# Patient Record
Sex: MALE | Race: BLACK OR AFRICAN AMERICAN | NOT HISPANIC OR LATINO | Employment: UNEMPLOYED | URBAN - METROPOLITAN AREA
[De-identification: names, ages, dates, MRNs, and addresses within clinical notes are randomized per-mention and may not be internally consistent; named-entity substitution may affect disease eponyms.]

---
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
## 2022-03-29 ENCOUNTER — HOSPITAL ENCOUNTER (INPATIENT)
Facility: CLINIC | Age: 22
LOS: 2 days | Discharge: HOME | End: 2022-03-31
Attending: EMERGENCY MEDICINE | Admitting: HOSPITALIST
Payer: COMMERCIAL

## 2022-03-29 DIAGNOSIS — E10.10 DIABETIC KETOACIDOSIS WITHOUT COMA ASSOCIATED WITH TYPE 1 DIABETES MELLITUS (H): ICD-10-CM

## 2022-03-29 DIAGNOSIS — E10.65 TYPE 1 DIABETES MELLITUS WITH HYPERGLYCEMIA (H): Primary | ICD-10-CM

## 2022-03-29 LAB
ALBUMIN SERPL-MCNC: 3.5 G/DL (ref 3.4–5)
ALBUMIN UR-MCNC: 30 MG/DL
ALP SERPL-CCNC: 142 U/L (ref 65–260)
ALT SERPL W P-5'-P-CCNC: 44 U/L (ref 0–50)
ANION GAP SERPL CALCULATED.3IONS-SCNC: 13 MMOL/L (ref 3–14)
ANION GAP SERPL CALCULATED.3IONS-SCNC: 17 MMOL/L (ref 3–14)
ANION GAP SERPL CALCULATED.3IONS-SCNC: 18 MMOL/L (ref 3–14)
ANION GAP SERPL CALCULATED.3IONS-SCNC: 21 MMOL/L (ref 3–14)
ANION GAP SERPL CALCULATED.3IONS-SCNC: 26 MMOL/L (ref 3–14)
APPEARANCE UR: CLEAR
AST SERPL W P-5'-P-CCNC: 26 U/L (ref 0–35)
ATRIAL RATE - MUSE: 110 BPM
BASE EXCESS BLDV CALC-SCNC: -11.2 MMOL/L (ref -7.7–1.9)
BASE EXCESS BLDV CALC-SCNC: -18.5 MMOL/L (ref -7.7–1.9)
BASOPHILS # BLD AUTO: 0.1 10E3/UL (ref 0–0.2)
BASOPHILS NFR BLD AUTO: 1 %
BILIRUB DIRECT SERPL-MCNC: <0.1 MG/DL (ref 0–0.2)
BILIRUB SERPL-MCNC: 0.5 MG/DL (ref 0.2–1.3)
BILIRUB UR QL STRIP: NEGATIVE
BUN SERPL-MCNC: 11 MG/DL (ref 7–30)
BUN SERPL-MCNC: 12 MG/DL (ref 7–30)
BUN SERPL-MCNC: 12 MG/DL (ref 7–30)
BUN SERPL-MCNC: 15 MG/DL (ref 7–30)
BUN SERPL-MCNC: 9 MG/DL (ref 7–30)
CALCIUM SERPL-MCNC: 7.8 MG/DL (ref 8.5–10.1)
CALCIUM SERPL-MCNC: 8.2 MG/DL (ref 8.5–10.1)
CALCIUM SERPL-MCNC: 8.3 MG/DL (ref 8.5–10.1)
CALCIUM SERPL-MCNC: 8.6 MG/DL (ref 8.5–10.1)
CALCIUM SERPL-MCNC: 8.9 MG/DL (ref 8.5–10.1)
CHLORIDE BLD-SCNC: 103 MMOL/L (ref 98–110)
CHLORIDE BLD-SCNC: 104 MMOL/L (ref 98–110)
CHLORIDE BLD-SCNC: 104 MMOL/L (ref 98–110)
CHLORIDE BLD-SCNC: 105 MMOL/L (ref 98–110)
CHLORIDE BLD-SCNC: 97 MMOL/L (ref 98–110)
CO2 SERPL-SCNC: 14 MMOL/L (ref 20–32)
CO2 SERPL-SCNC: 5 MMOL/L (ref 20–32)
CO2 SERPL-SCNC: 6 MMOL/L (ref 20–32)
CO2 SERPL-SCNC: 8 MMOL/L (ref 20–32)
CO2 SERPL-SCNC: 9 MMOL/L (ref 20–32)
COLOR UR AUTO: ABNORMAL
CREAT SERPL-MCNC: 0.79 MG/DL (ref 0.5–1)
CREAT SERPL-MCNC: 0.81 MG/DL (ref 0.5–1)
CREAT SERPL-MCNC: 0.85 MG/DL (ref 0.5–1)
CREAT SERPL-MCNC: 0.88 MG/DL (ref 0.5–1)
CREAT SERPL-MCNC: 0.95 MG/DL (ref 0.5–1)
DIASTOLIC BLOOD PRESSURE - MUSE: NORMAL MMHG
EOSINOPHIL # BLD AUTO: 0 10E3/UL (ref 0–0.7)
EOSINOPHIL NFR BLD AUTO: 0 %
ERYTHROCYTE [DISTWIDTH] IN BLOOD BY AUTOMATED COUNT: 13.2 % (ref 10–15)
GFR SERPL CREATININE-BSD FRML MDRD: >90 ML/MIN/1.73M2
GLUCOSE BLD-MCNC: 140 MG/DL (ref 70–99)
GLUCOSE BLD-MCNC: 174 MG/DL (ref 70–99)
GLUCOSE BLD-MCNC: 223 MG/DL (ref 70–99)
GLUCOSE BLD-MCNC: 315 MG/DL (ref 70–99)
GLUCOSE BLD-MCNC: 488 MG/DL (ref 70–99)
GLUCOSE BLDC GLUCOMTR-MCNC: 113 MG/DL (ref 70–99)
GLUCOSE BLDC GLUCOMTR-MCNC: 127 MG/DL (ref 70–99)
GLUCOSE BLDC GLUCOMTR-MCNC: 139 MG/DL (ref 70–99)
GLUCOSE BLDC GLUCOMTR-MCNC: 139 MG/DL (ref 70–99)
GLUCOSE BLDC GLUCOMTR-MCNC: 142 MG/DL (ref 70–99)
GLUCOSE BLDC GLUCOMTR-MCNC: 143 MG/DL (ref 70–99)
GLUCOSE BLDC GLUCOMTR-MCNC: 155 MG/DL (ref 70–99)
GLUCOSE BLDC GLUCOMTR-MCNC: 161 MG/DL (ref 70–99)
GLUCOSE BLDC GLUCOMTR-MCNC: 190 MG/DL (ref 70–99)
GLUCOSE BLDC GLUCOMTR-MCNC: 222 MG/DL (ref 70–99)
GLUCOSE BLDC GLUCOMTR-MCNC: 239 MG/DL (ref 70–99)
GLUCOSE BLDC GLUCOMTR-MCNC: 240 MG/DL (ref 70–99)
GLUCOSE BLDC GLUCOMTR-MCNC: 299 MG/DL (ref 70–99)
GLUCOSE BLDC GLUCOMTR-MCNC: 338 MG/DL (ref 70–99)
GLUCOSE BLDC GLUCOMTR-MCNC: 540 MG/DL (ref 70–99)
GLUCOSE UR STRIP-MCNC: >=1000 MG/DL
HBA1C MFR BLD: 13 % (ref 0–5.6)
HCO3 BLDV-SCNC: 14 MMOL/L (ref 21–28)
HCO3 BLDV-SCNC: 4 MMOL/L (ref 21–28)
HCO3 BLDV-SCNC: 8 MMOL/L (ref 21–28)
HCT VFR BLD AUTO: 51.3 % (ref 40–53)
HGB BLD-MCNC: 16.5 G/DL (ref 13.3–17.7)
HGB UR QL STRIP: ABNORMAL
IMM GRANULOCYTES # BLD: 0.1 10E3/UL
IMM GRANULOCYTES NFR BLD: 1 %
INTERPRETATION ECG - MUSE: NORMAL
KETONES BLD-SCNC: 3 MMOL/L (ref 0–0.6)
KETONES BLD-SCNC: 4.3 MMOL/L (ref 0–0.6)
KETONES BLD-SCNC: 4.9 MMOL/L (ref 0–0.6)
KETONES UR STRIP-MCNC: >150 MG/DL
LACTATE BLD-SCNC: 1.8 MMOL/L
LEUKOCYTE ESTERASE UR QL STRIP: NEGATIVE
LYMPHOCYTES # BLD AUTO: 2.1 10E3/UL (ref 0.8–5.3)
LYMPHOCYTES NFR BLD AUTO: 19 %
MAGNESIUM SERPL-MCNC: 2.4 MG/DL (ref 1.6–2.3)
MCH RBC QN AUTO: 28 PG (ref 26.5–33)
MCHC RBC AUTO-ENTMCNC: 32.2 G/DL (ref 31.5–36.5)
MCV RBC AUTO: 87 FL (ref 78–100)
MONOCYTES # BLD AUTO: 0.4 10E3/UL (ref 0–1.3)
MONOCYTES NFR BLD AUTO: 3 %
MUCOUS THREADS #/AREA URNS LPF: PRESENT /LPF
NEUTROPHILS # BLD AUTO: 8.8 10E3/UL (ref 1.6–8.3)
NEUTROPHILS NFR BLD AUTO: 76 %
NITRATE UR QL: NEGATIVE
NRBC # BLD AUTO: 0 10E3/UL
NRBC BLD AUTO-RTO: 0 /100
O2/TOTAL GAS SETTING VFR VENT: 0 %
O2/TOTAL GAS SETTING VFR VENT: 0 %
OSMOLALITY SERPL: 319 MMOL/KG (ref 275–295)
P AXIS - MUSE: 67 DEGREES
PCO2 BLDV: 16 MM HG (ref 40–50)
PCO2 BLDV: 23 MM HG (ref 40–50)
PCO2 BLDV: 29 MM HG (ref 40–50)
PH BLDV: 7.06 [PH] (ref 7.32–7.43)
PH BLDV: 7.16 [PH] (ref 7.32–7.43)
PH BLDV: 7.29 [PH] (ref 7.32–7.43)
PH UR STRIP: 5 [PH] (ref 5–7)
PHOSPHATE SERPL-MCNC: 3.8 MG/DL (ref 2.5–4.5)
PLATELET # BLD AUTO: 352 10E3/UL (ref 150–450)
PO2 BLDV: 45 MM HG (ref 25–47)
PO2 BLDV: 64 MM HG (ref 25–47)
PO2 BLDV: 77 MM HG (ref 25–47)
POTASSIUM BLD-SCNC: 4.2 MMOL/L (ref 3.4–5.3)
POTASSIUM BLD-SCNC: 4.6 MMOL/L (ref 3.4–5.3)
POTASSIUM BLD-SCNC: 5 MMOL/L (ref 3.4–5.3)
PR INTERVAL - MUSE: 160 MS
PROT SERPL-MCNC: 8.1 G/DL (ref 6.8–8.8)
QRS DURATION - MUSE: 92 MS
QT - MUSE: 356 MS
QTC - MUSE: 481 MS
R AXIS - MUSE: 266 DEGREES
RBC # BLD AUTO: 5.89 10E6/UL (ref 4.4–5.9)
RBC URINE: 0 /HPF
SAO2 % BLDV: 82 % (ref 94–100)
SARS-COV-2 RNA RESP QL NAA+PROBE: NEGATIVE
SODIUM SERPL-SCNC: 128 MMOL/L (ref 133–144)
SODIUM SERPL-SCNC: 130 MMOL/L (ref 133–144)
SODIUM SERPL-SCNC: 130 MMOL/L (ref 133–144)
SODIUM SERPL-SCNC: 131 MMOL/L (ref 133–144)
SODIUM SERPL-SCNC: 131 MMOL/L (ref 133–144)
SP GR UR STRIP: 1.03 (ref 1–1.03)
SYSTOLIC BLOOD PRESSURE - MUSE: NORMAL MMHG
T AXIS - MUSE: 75 DEGREES
TROPONIN I SERPL HS-MCNC: <3 NG/L
UROBILINOGEN UR STRIP-MCNC: NORMAL MG/DL
VENTRICULAR RATE- MUSE: 110 BPM
WBC # BLD AUTO: 11.5 10E3/UL (ref 4–11)
WBC URINE: 0 /HPF

## 2022-03-29 PROCEDURE — 99223 1ST HOSP IP/OBS HIGH 75: CPT | Performed by: HOSPITALIST

## 2022-03-29 PROCEDURE — 93010 ELECTROCARDIOGRAM REPORT: CPT | Performed by: INTERNAL MEDICINE

## 2022-03-29 PROCEDURE — 82310 ASSAY OF CALCIUM: CPT | Performed by: NURSE PRACTITIONER

## 2022-03-29 PROCEDURE — 96361 HYDRATE IV INFUSION ADD-ON: CPT

## 2022-03-29 PROCEDURE — 36415 COLL VENOUS BLD VENIPUNCTURE: CPT | Performed by: HOSPITALIST

## 2022-03-29 PROCEDURE — 82248 BILIRUBIN DIRECT: CPT | Performed by: HOSPITALIST

## 2022-03-29 PROCEDURE — 82310 ASSAY OF CALCIUM: CPT | Performed by: HOSPITALIST

## 2022-03-29 PROCEDURE — 83735 ASSAY OF MAGNESIUM: CPT | Performed by: EMERGENCY MEDICINE

## 2022-03-29 PROCEDURE — 96365 THER/PROPH/DIAG IV INF INIT: CPT

## 2022-03-29 PROCEDURE — 82803 BLOOD GASES ANY COMBINATION: CPT | Performed by: HOSPITALIST

## 2022-03-29 PROCEDURE — 36415 COLL VENOUS BLD VENIPUNCTURE: CPT | Performed by: EMERGENCY MEDICINE

## 2022-03-29 PROCEDURE — 99207 PR NO CHARGE LOS: CPT | Performed by: NURSE PRACTITIONER

## 2022-03-29 PROCEDURE — 83036 HEMOGLOBIN GLYCOSYLATED A1C: CPT | Performed by: EMERGENCY MEDICINE

## 2022-03-29 PROCEDURE — 250N000012 HC RX MED GY IP 250 OP 636 PS 637: Performed by: NURSE PRACTITIONER

## 2022-03-29 PROCEDURE — 258N000003 HC RX IP 258 OP 636: Performed by: NURSE PRACTITIONER

## 2022-03-29 PROCEDURE — C9803 HOPD COVID-19 SPEC COLLECT: HCPCS

## 2022-03-29 PROCEDURE — 96366 THER/PROPH/DIAG IV INF ADDON: CPT

## 2022-03-29 PROCEDURE — U0005 INFEC AGEN DETEC AMPLI PROBE: HCPCS | Performed by: EMERGENCY MEDICINE

## 2022-03-29 PROCEDURE — 250N000011 HC RX IP 250 OP 636: Performed by: NURSE PRACTITIONER

## 2022-03-29 PROCEDURE — 36415 COLL VENOUS BLD VENIPUNCTURE: CPT | Performed by: NURSE PRACTITIONER

## 2022-03-29 PROCEDURE — 93005 ELECTROCARDIOGRAM TRACING: CPT

## 2022-03-29 PROCEDURE — 250N000011 HC RX IP 250 OP 636: Performed by: EMERGENCY MEDICINE

## 2022-03-29 PROCEDURE — 87040 BLOOD CULTURE FOR BACTERIA: CPT | Performed by: EMERGENCY MEDICINE

## 2022-03-29 PROCEDURE — 83605 ASSAY OF LACTIC ACID: CPT

## 2022-03-29 PROCEDURE — 99285 EMERGENCY DEPT VISIT HI MDM: CPT | Mod: 25

## 2022-03-29 PROCEDURE — 82803 BLOOD GASES ANY COMBINATION: CPT

## 2022-03-29 PROCEDURE — 84484 ASSAY OF TROPONIN QUANT: CPT | Performed by: EMERGENCY MEDICINE

## 2022-03-29 PROCEDURE — 84100 ASSAY OF PHOSPHORUS: CPT | Performed by: EMERGENCY MEDICINE

## 2022-03-29 PROCEDURE — 83930 ASSAY OF BLOOD OSMOLALITY: CPT | Performed by: EMERGENCY MEDICINE

## 2022-03-29 PROCEDURE — 99223 1ST HOSP IP/OBS HIGH 75: CPT | Mod: AI | Performed by: NURSE PRACTITIONER

## 2022-03-29 PROCEDURE — 258N000003 HC RX IP 258 OP 636: Performed by: EMERGENCY MEDICINE

## 2022-03-29 PROCEDURE — 250N000012 HC RX MED GY IP 250 OP 636 PS 637: Performed by: EMERGENCY MEDICINE

## 2022-03-29 PROCEDURE — 82010 KETONE BODYS QUAN: CPT | Performed by: HOSPITALIST

## 2022-03-29 PROCEDURE — 82010 KETONE BODYS QUAN: CPT | Performed by: EMERGENCY MEDICINE

## 2022-03-29 PROCEDURE — 80053 COMPREHEN METABOLIC PANEL: CPT | Performed by: EMERGENCY MEDICINE

## 2022-03-29 PROCEDURE — 81001 URINALYSIS AUTO W/SCOPE: CPT | Performed by: EMERGENCY MEDICINE

## 2022-03-29 PROCEDURE — 120N000001 HC R&B MED SURG/OB

## 2022-03-29 PROCEDURE — 85014 HEMATOCRIT: CPT | Performed by: EMERGENCY MEDICINE

## 2022-03-29 RX ORDER — SODIUM CHLORIDE 9 MG/ML
1000 INJECTION, SOLUTION INTRAVENOUS CONTINUOUS
Status: DISCONTINUED | OUTPATIENT
Start: 2022-03-29 | End: 2022-03-30

## 2022-03-29 RX ORDER — DEXTROSE MONOHYDRATE 25 G/50ML
25-50 INJECTION, SOLUTION INTRAVENOUS
Status: DISCONTINUED | OUTPATIENT
Start: 2022-03-29 | End: 2022-03-30

## 2022-03-29 RX ORDER — NICOTINE POLACRILEX 4 MG
15-30 LOZENGE BUCCAL
Status: DISCONTINUED | OUTPATIENT
Start: 2022-03-29 | End: 2022-03-30

## 2022-03-29 RX ORDER — SODIUM CHLORIDE AND POTASSIUM CHLORIDE 150; 450 MG/100ML; MG/100ML
INJECTION, SOLUTION INTRAVENOUS CONTINUOUS
Status: DISCONTINUED | OUTPATIENT
Start: 2022-03-29 | End: 2022-03-30

## 2022-03-29 RX ORDER — DEXTROSE MONOHYDRATE 25 G/50ML
25-50 INJECTION, SOLUTION INTRAVENOUS
Status: DISCONTINUED | OUTPATIENT
Start: 2022-03-29 | End: 2022-03-29

## 2022-03-29 RX ORDER — POTASSIUM CHLORIDE 1.5 G/1.58G
40 POWDER, FOR SOLUTION ORAL ONCE
Status: DISCONTINUED | OUTPATIENT
Start: 2022-03-29 | End: 2022-03-29

## 2022-03-29 RX ORDER — DEXTROSE MONOHYDRATE, SODIUM CHLORIDE, AND POTASSIUM CHLORIDE 50; 1.49; 4.5 G/1000ML; G/1000ML; G/1000ML
INJECTION, SOLUTION INTRAVENOUS CONTINUOUS
Status: DISCONTINUED | OUTPATIENT
Start: 2022-03-29 | End: 2022-03-30

## 2022-03-29 RX ORDER — POTASSIUM CHLORIDE 7.45 MG/ML
10 INJECTION INTRAVENOUS ONCE
Status: COMPLETED | OUTPATIENT
Start: 2022-03-29 | End: 2022-03-29

## 2022-03-29 RX ADMIN — SODIUM CHLORIDE 2 UNITS/HR: 9 INJECTION, SOLUTION INTRAVENOUS at 14:34

## 2022-03-29 RX ADMIN — SODIUM CHLORIDE 4.5 UNITS/HR: 9 INJECTION, SOLUTION INTRAVENOUS at 09:13

## 2022-03-29 RX ADMIN — POTASSIUM CHLORIDE, DEXTROSE MONOHYDRATE AND SODIUM CHLORIDE: 150; 5; 450 INJECTION, SOLUTION INTRAVENOUS at 12:41

## 2022-03-29 RX ADMIN — POTASSIUM CHLORIDE 10 MEQ: 7.46 INJECTION, SOLUTION INTRAVENOUS at 09:25

## 2022-03-29 RX ADMIN — SODIUM CHLORIDE 1000 ML: 9 INJECTION, SOLUTION INTRAVENOUS at 08:01

## 2022-03-29 RX ADMIN — SODIUM CHLORIDE 6 UNITS/HR: 9 INJECTION, SOLUTION INTRAVENOUS at 11:38

## 2022-03-29 RX ADMIN — SODIUM CHLORIDE 1000 ML: 9 INJECTION, SOLUTION INTRAVENOUS at 09:07

## 2022-03-29 RX ADMIN — SODIUM CHLORIDE 1000 ML: 9 INJECTION, SOLUTION INTRAVENOUS at 09:13

## 2022-03-29 RX ADMIN — POTASSIUM CHLORIDE AND SODIUM CHLORIDE: 450; 150 INJECTION, SOLUTION INTRAVENOUS at 12:07

## 2022-03-29 ASSESSMENT — ENCOUNTER SYMPTOMS
VOMITING: 1
NAUSEA: 1
FEVER: 1

## 2022-03-29 ASSESSMENT — ACTIVITIES OF DAILY LIVING (ADL)
ADLS_ACUITY_SCORE: 5
ADLS_ACUITY_SCORE: 6
ADLS_ACUITY_SCORE: 5
ADLS_ACUITY_SCORE: 5
ADLS_ACUITY_SCORE: 6
ADLS_ACUITY_SCORE: 5
ADLS_ACUITY_SCORE: 5
ADLS_ACUITY_SCORE: 6

## 2022-03-29 NOTE — PROVIDER NOTIFICATION
"   03/29/22 1319   Critical Test Results/Notification   Critical Lab Result (Lab Name and Value) Venous Blood Gas pH 7.16 and bicarb 8   What Time Did The Lab Notify You? 1320   What Provider Did You Notify? Dr. Owen   Was the Provider Notified Within 30 Min?  Yes     Lab also called about critical Ketones Qualitative Level of 4.3. Text page to Dr. Owen.     Page back from Dr. Owen to \"monitor in 2 hours.\"  "

## 2022-03-29 NOTE — ED NOTES
DATE:  3/29/2022   TIME OF RECEIPT FROM LAB:  0833  LAB TEST:  Ketone 4.9  LAB VALUE:  See above  RESULTS GIVEN WITH READ-BACK TO (PROVIDER):  Ravi Mcdonald,*  TIME LAB VALUE REPORTED TO PROVIDER:   0833

## 2022-03-29 NOTE — PROVIDER NOTIFICATION
Paged Dr. Owen    DATE:  3/29/2022   TIME OF RECEIPT FROM LAB: 3/29/22  LAB TEST:Carbon Dioxide  LAB VALUE:  8  Text Paged Dr. Owen. Provider stated to continue to monitor, and will repeat in 2 hours.

## 2022-03-29 NOTE — PROGRESS NOTES
Critical lab value, Co2 of 9. Nurse notified.    Provider paged and no new orders obtained - will continue to monitor

## 2022-03-29 NOTE — ED PROVIDER NOTES
History     Chief Complaint:  Hyperglycemia     The history is provided by the patient and the EMS personnel.      Lauro Maddox is a 21 year old male with a history of insulin-dependent type I diabetes mellitus, DKA who presents with hyperglycemia for the past few hours. He reports that he has had associated nausea, vomiting, and subjective fevers. Per EMS, his blood glucose was 579 en route to the ED. He is not sure what may have triggered the hyperglycemia. Here in the ED, Lauro reports that he is prescribed Novolog and Lantus for his insulin but has only been using these intermittently. He states that he was most recently in DKA one year ago. He denies regular alcohol use but reports that he does smoke tobacco and use marijuana.    Review of Systems   Constitutional: Positive for fever (subjective).   Gastrointestinal: Positive for nausea and vomiting.   All other systems reviewed and are negative.    Allergies:  The patient has no known allergies.     Medications:  Vitamin D  Glucagon  Insulin    Past Medical History:     Type I diabetes mellitus, insulin dependent   DKA  Vitamin D deficiency   STEPAN  Hyponatremia  Hoarding behavior    Past Surgical History:    ORIF thumb, right     Family History:    Brother: congenital heart disease     Social History:  The patient presents to the ED alone.  The patient presents to the ED via EMS.  The patient is a smoker.  The patient uses marijuana.  The patient does not drink alcohol.    Physical Exam     Patient Vitals for the past 24 hrs:   BP Temp Temp src Pulse Resp SpO2 Height Weight   03/29/22 1053 -- -- -- 112 30 100 % -- --   03/29/22 1031 -- -- -- 105 20 100 % -- --   03/29/22 1030 133/87 -- -- 106 21 100 % -- --   03/29/22 1000 (!) 134/91 -- -- 110 21 100 % -- --   03/29/22 0930 (!) 141/79 -- -- 110 23 100 % -- --   03/29/22 0914 -- -- -- 111 23 100 % -- --   03/29/22 0900 (!) 146/90 -- -- 111 22 100 % -- --   03/29/22 0826 -- -- -- 110 24 100 % -- --   03/29/22  "0820 -- -- -- 111 27 100 % -- --   03/29/22 0819 (!) 142/93 -- -- 110 -- -- -- --   03/29/22 0754 -- 97.6  F (36.4  C) Oral -- -- 100 % -- --   03/29/22 0751 -- -- -- 112 22 -- -- --   03/29/22 0743 138/87 -- -- -- -- -- 1.88 m (6' 2\") 74.8 kg (165 lb)     Physical Exam  General: Alert, interactive in mild distress  Head:  Scalp is atraumatic  Eyes:  The pupils are equal, round, and reactive to light    EOM's intact    No scleral icterus  ENT:      Nose:  The external nose is normal  Ears:  External ears are normal  Mouth/Throat: The oropharynx is normal    Mucus membranes are dry      Neck:  Normal range of motion.      There is no rigidity.    Trachea is in the midline         CV:  Tachycardic rate with regular rhythm    No murmur   Resp:  Breath sounds are clear bilaterally    Non-labored, no retractions or accessory muscle use      GI:  Abdomen is soft, no distension, no tenderness.       MS:  Normal strength in all 4 extremities  Skin:  Warm and dry, No rash or lesions noted.  Neuro: Strength 5/5 x4.  Sensation intact  In all 4 extremities.      GCS: 15  Psych:  Awake. Alert.  Normal affect.      Appropriate interactions.    Emergency Department Course     ECG:  ECG taken at 0757, ECG read at 0811  Sinus tachycardia  Possible left atrial enlargement  Right superior axis deviation  Incomplete right bundle branch block   Nonspecific ST abnormality  Abnormal ECG  No significant change as compared to prior, dated 7/22/20.  Rate 110 bpm. WA interval 160 ms. QRS duration 92 ms. QT/QTc 356/481 ms. P-R-T axes 67 266 75.     Laboratory:  Labs Ordered and Resulted from Time of ED Arrival to Time of ED Departure   HEMOGLOBIN A1C - Abnormal       Result Value    Hemoglobin A1C 13.0 (*)    BASIC METABOLIC PANEL - Abnormal    Sodium 128 (*)     Potassium 5.0      Chloride 97 (*)     Carbon Dioxide (CO2) 5 (*)     Anion Gap 26 (*)     Urea Nitrogen 15      Creatinine 0.95      Calcium 8.9      Glucose 488 (*)     GFR Estimate " >90     MAGNESIUM - Abnormal    Magnesium 2.4 (*)    OSMOLALITY - Abnormal    Osmolality Blood 319 (*)    KETONE BETA-HYDROXYBUTYRATE QUANTITATIVE, RAPID - Abnormal    Ketone (Beta-Hydroxybutyrate) Quantitative 4.9 (*)    ROUTINE UA WITH MICROSCOPIC REFLEX TO CULTURE - Abnormal    Color Urine Straw      Appearance Urine Clear      Glucose Urine >=1000 (*)     Bilirubin Urine Negative      Ketones Urine >150 (*)     Specific Gravity Urine 1.029      Blood Urine Trace (*)     pH Urine 5.0      Protein Albumin Urine 30  (*)     Urobilinogen Urine Normal      Nitrite Urine Negative      Leukocyte Esterase Urine Negative      Mucus Urine Present (*)     RBC Urine 0      WBC Urine 0     GLUCOSE BY METER - Abnormal    GLUCOSE BY METER POCT 540 (*)    CBC WITH PLATELETS AND DIFFERENTIAL - Abnormal    WBC Count 11.5 (*)     RBC Count 5.89      Hemoglobin 16.5      Hematocrit 51.3      MCV 87      MCH 28.0      MCHC 32.2      RDW 13.2      Platelet Count 352      % Neutrophils 76      % Lymphocytes 19      % Monocytes 3      % Eosinophils 0      % Basophils 1      % Immature Granulocytes 1      NRBCs per 100 WBC 0      Absolute Neutrophils 8.8 (*)     Absolute Lymphocytes 2.1      Absolute Monocytes 0.4      Absolute Eosinophils 0.0      Absolute Basophils 0.1      Absolute Immature Granulocytes 0.1      Absolute NRBCs 0.0     ISTAT GASES LACTATE VENOUS POCT - Abnormal    Lactic Acid POCT 1.8      Bicarbonate Venous POCT 4 (*)     O2 Sat, Venous POCT 82 (*)     pCO2V Venous POCT 16 (*)     pH Venous POCT 7.06 (*)     pO2 Venous POCT 64 (*)    GLUCOSE BY METER - Abnormal    GLUCOSE BY METER POCT 338 (*)    GLUCOSE BY METER - Abnormal    GLUCOSE BY METER POCT 299 (*)    BASIC METABOLIC PANEL - Abnormal    Sodium 130 (*)     Potassium 4.6      Chloride 103      Carbon Dioxide (CO2) 6 (*)     Anion Gap 21 (*)     Urea Nitrogen 12      Creatinine 0.88      Calcium 7.8 (*)     Glucose 315 (*)     GFR Estimate >90     GLUCOSE BY  METER - Abnormal    GLUCOSE BY METER POCT 222 (*)    PHOSPHORUS - Normal    Phosphorus 3.8     TROPONIN I - Normal    Troponin I High Sensitivity <3     COVID-19 VIRUS (CORONAVIRUS) BY PCR - Normal    SARS CoV2 PCR Negative     GLUCOSE MONITOR NURSING POCT   BLOOD CULTURE   BLOOD CULTURE      Emergency Department Course:       Reviewed:  I reviewed nursing notes, vitals, past medical history, Care Everywhere    Assessments:  0751 I obtained history and examined the patient as noted above.   0910 I rechecked the patient and explained findings.     Consults:  0918 I spoke with Dr. Owen from the hospitalist service regarding the patient's presentation, findings here in the ED, and plan of care.     Interventions:  0801 NS 1 L IV  0907 NS 1 L IV  0913 Insulin drip 4.5 units/hr IV  0913 NS 1 L IV  0925 Potassium chloride 10 mEq IV  1015 Insulin drip dose change 8 units/hr IV    Disposition:  The patient was admitted to the hospital under the care of Dr. Owen.     Impression & Plan     Medical Decision Making:  Lauro Maddox is a 21 year old male who presents to the emergency department for evaluation of hyperglycemia.  Findings are consistent with diabetic ketoacidosis, there is no signs of an acute infectious etiology, no signs of acute coronary syndrome.  After IV fluid resuscitation and initiation of the insulin drip his blood sugar trended down nicely.  He was quite acidotic at arrival consistent with DKA.  I discussed case with the hospitalist service who is in agreement with this plan of action.  Patient was subsequently admitted for further evaluation and treatment.    Critical Care Time: was 30 minutes for this patient excluding procedures, during this time there was management of the insulin drip, fluid resuscitation, physician consultation, and chart review.    Diagnosis:    ICD-10-CM    1. Diabetic ketoacidosis without coma associated with type 1 diabetes mellitus (H)  E10.10      Scribe  Disclosure:  I, Radha Dennis, am serving as a scribe at 7:45 AM on 3/29/2022 to document services personally performed by Ravi Mcdonald MD based on my observations and the provider's statements to me.        Ravi Mcdonald MD  03/29/22 5937

## 2022-03-29 NOTE — ED NOTES
Bed: ED21  Expected date:   Expected time:   Means of arrival:   Comments:  449  21 M glucose [phone]

## 2022-03-29 NOTE — ED TRIAGE NOTES
Patient presents to ED via EMS from home. Patient woke up at 3:30am this morning not feeling well, weakness with n/v. Patient type 1 diabetic, does not have insulin pump. States his sugar this morning was 579. Patient states he took insulin last night, but has not been taking insulin daily. He takes Lantus and Novolog. He also reports pain to his entire back. Denies any recent illnesses.

## 2022-03-29 NOTE — H&P
[facility]    History and Physical - Hospitalist Service       Date of Admission:  3/29/2022    Assessment & Plan      Lauro Maddox is a 21 year old male admitted on 3/29/2022. He has a PMHx includes uncontrolled T1DM who presents to the ED with dizziness/lightheadedness, nausea/vomiting and glucose >500.    DKA 2/ medication noncompliance  Type I Diabetes Mellitus, uncontrolled  AG Metabolic Acidosis 2/2 above   Hx of DKA hospitalizations   Reports to ED he takes insulin intermittently, endorses nausea/vomiting x several hours PTA. PTA meds lantus and novolog. Glucose 540 on admission. VBG 7.06/16/64/4. Fluid resuscitated in ED 0.9% NS, insulin infusion initiated. Corrected Na 134. HgbA1c 13% - follows w/ Endocrinology in outpt setting, but was no-show for last appointment.   -- start 0.45% w/ kcl infusion on admission  -- BMP every 2 hours   -- continuous insulin infusion   -- IMC monitoring     Leukocytosis  WBC 11.5 on admission, suspect stress response. No compelling evidence for acute infection, Blood cultures pending.  --monitor fever curve       Diet:  NPO  DVT Prophylaxis: Pneumatic Compression Devices  Wilson Catheter: Not present  Central Lines: None  Cardiac Monitoring: None  Code Status:  Full Code     Clinically Significant Risk Factors Present on Admission         # Hyponatremia: Na = 128 mmol/L (Ref range: 133 - 144 mmol/L) on admission, will monitor as appropriate    # Anion Gap Metabolic Acidosis: AG = 26 mmol/L (Ref range: 3 - 14 mmol/L) on admission, will monitor and treat as appropriate           Disposition Plan   Expected Discharge:  2-3 days    Anticipated discharge location:  Awaiting care coordination huddle  Delays:   NA        The patient's care was discussed with the Attending Physician, Dr. Owen and Patient.    DESIREE [facility]  Hospitalist Service  [facility]  Securely message with the Vocera Web Console (learn more  here)  Text page via [facility] Paging/Directory         ______________________________________________________________________    Chief Complaint   Dizziness, lightheadedness     History is obtained from the patient and [facility]     History of Present Illness   Lauro Maddox is a 21 year old male who has a PMHx includes uncontrolled T1DM who presents to the ED with dizziness/lightheadedness, nausea/vomiting and glucose >500.    Patient is minimally cooperative with interview, much of the history is obtained through epic documentation and ED physician.  Patient notes he intermittently takes his insulin to the ED provider, however tells me he takes it every day.  He notes subjective nausea/vomiting starting several hours prior to admission, and endorses dizziness/lightheadedness and general feeling of unwell.  He has had multiple hospitalizations with DKA, and suspected he was again experiencing DKA so presented to the ED via EMS.     ED work-up is significant for Na 128, serum bicarb 5, normal creat, AG 26, Mag 2.4, ketones 4.9, osmolality 319, lactic 1.8, trop <3. VBG 7.06/16/64/4. WBC 11.5. UA w/ >1000 glucose, postiive ketones, proteinuria. Glucose trending down with insulin infusion 588-->577-->338. 2L NS administered in ED.     On my exam, the patient is asleep on my arrival, wakes to voice.  Again, he has minimally cooperative with the interview, but denies any current pain, nausea/vomiting, dizziness, fever, shortness of breath, sick contacts. States he has been eating/drinking and urinating normally.     Review of Systems    The 10 point Review of Systems is negative other than noted in the HPI or here.     Past Medical History    I have reviewed this patient's medical history and updated it with pertinent information if needed.   Past Medical History:   Diagnosis Date     Diabetes (H)        Past Surgical History   I have reviewed this patient's surgical history and updated it with pertinent information if  needed.  Past Surgical History:   Procedure Laterality Date     NO HISTORY OF SURGERY         Social History   I have reviewed this patient's social history and updated it with pertinent information if needed.  Social History     Tobacco Use     Smoking status: Current Every Day Smoker     Types: Cigarettes     Smokeless tobacco: Never Used   Substance Use Topics     Alcohol use: None     Drug use: Yes     Types: Marijuana       Family History   I have reviewed this patient's family history and updated it with pertinent information if needed.  Family History   Problem Relation Age of Onset     Diabetes Paternal Grandfather         Onset in childhood per Lauro     Hypertension No family hx of      Cancer No family hx of      Endocrine Disease No family hx of      Thyroid Disease No family hx of      Glaucoma No family hx of      Macular Degeneration No family hx of      Cerebrovascular Disease No family hx of        Prior to Admission Medications   Prior to Admission Medications   Prescriptions Last Dose Informant Patient Reported? Taking?   acetone, Urine, test STRP   No No   Sig: Test ketones when sick or when have 2 blood sugars in a row >300   blood glucose (ACCU-CHEK FASTCLIX) lancing device   No No   Sig: Device to be used with lancets.   blood glucose monitoring (ACCU-CHEK JULIO C PLUS) meter device kit   No No   Sig: Use to test blood sugars 6-8 times daily or as directed.   blood glucose monitoring (ACCU-CHEK JULIO C PLUS) test strip   No No   Sig: Use to test blood sugar 10 times daily or as directed. PA approved 17-20   blood glucose monitoring (ACCU-CHEK FASTCLIX) lancets   No No   Si each daily   cholecalciferol (VITAMIN D) 1000 UNIT tablet  Self No No   Sig: Take 1 tablet (1,000 Units) by mouth daily   cloNIDine (CATAPRES) 0.1 MG tablet  Self No No   Si tablet at bedtime, 1/2 tablet in the morning and 1/2 in the afternoon   Patient not taking: Reported on 10/27/2017   glucagon (GLUCAGON  EMERGENCY) 1 MG kit   No No   Simg injection for severe hypoglycemica involving loss of consciousness or seizure.   insulin aspart (NOVOLOG FLEXPEN) 100 UNIT/ML injection   No No   Sig: Up to 75 units daily   insulin degludec (TRESIBA) 100 UNIT/ML pen   No No   Si units once daily at the same time each day   insulin pen needle (BD ALIN U/F) 32G X 4 MM   No No   Sig: Use 8 daily or as directed.      Facility-Administered Medications: None     Allergies   No Known Allergies    Physical Exam   Vital Signs: Temp: 97.6  F (36.4  C) Temp src: Oral BP: (!) 141/79 Pulse: 110   Resp: 23 SpO2: 100 % O2 Device: None (Room air)    Weight: 165 lbs 0 oz    Physical Exam  Vitals and nursing note reviewed.   Constitutional:       General: He is not in acute distress.     Appearance: He is ill-appearing. He is not toxic-appearing.   HENT:      Mouth/Throat:      Mouth: Mucous membranes are moist.   Eyes:      Pupils: Pupils are equal, round, and reactive to light.   Cardiovascular:      Rate and Rhythm: Regular rhythm. Tachycardia present.      Pulses: Normal pulses.      Heart sounds: Normal heart sounds.   Pulmonary:      Effort: Pulmonary effort is normal. No respiratory distress.      Breath sounds: Normal breath sounds. No wheezing, rhonchi or rales.   Abdominal:      General: Abdomen is flat. There is no distension.      Palpations: Abdomen is soft.   Musculoskeletal:         General: Normal range of motion.   Skin:     General: Skin is warm and dry.   Neurological:      General: No focal deficit present.   Psychiatric:         Mood and Affect: Affect is flat.       Data   Data reviewed today: I reviewed all medications, new labs and imaging results over the last 24 hours. I personally reviewed no images or EKG's today.    Recent Labs   Lab 22  1009 22  0910 22  0813   WBC  --   --  11.5*   HGB  --   --  16.5   MCV  --   --  87   PLT  --   --  352   NA  --   --  128*   POTASSIUM  --   --  5.0    CHLORIDE  --   --  97*   CO2  --   --  5*   BUN  --   --  15   CR  --   --  0.95   ANIONGAP  --   --  26*   URBAN  --   --  8.9   * 338* 488*     No results found for this or any previous visit (from the past 24 hour(s)).

## 2022-03-29 NOTE — PHARMACY-ADMISSION MEDICATION HISTORY
Pharmacy Medication History  Admission medication history interview status for the 3/29/2022  admission is complete. See EPIC admission navigator for prior to admission medications     Location of Interview: Patient room  Medication history sources: Patient, Surescripts and Care Everywhere -- last fill per Surescripts was 7/7/21    In the past week, patient estimated taking medication this percent of the time: less than 50% due to other    Medication reconciliation completed by provider prior to medication history? No    Time spent in this activity: 10 min    Prior to Admission medications    Medication Sig Last Dose Taking? Auth Provider   acetone, Urine, test STRP Test ketones when sick or when have 2 blood sugars in a row >300   Sahrlene Maciel APRN CNP   blood glucose (ACCU-CHEK FASTCLIX) lancing device Device to be used with lancets.   Sharlene Maciel APRN CNP   blood glucose monitoring (ACCU-CHEK JULIO C PLUS) meter device kit Use to test blood sugars 6-8 times daily or as directed.   Sharlene Maciel APRN CNP   blood glucose monitoring (ACCU-CHEK JULIO C PLUS) test strip Use to test blood sugar 10 times daily or as directed. PA approved 8/7/17-9/7/20   Sharlene Maciel APRN CNP   blood glucose monitoring (ACCU-CHEK FASTCLIX) lancets 6 each daily   Sharlene Maciel APRN CNP   glucagon (GLUCAGON EMERGENCY) 1 MG kit 1mg injection for severe hypoglycemica involving loss of consciousness or seizure.   Sharlene Maciel APRN CNP   insulin aspart (NOVOLOG FLEXPEN) 100 UNIT/ML injection Up to 75 units daily   Sharlene Maciel APRN CNP   insulin glargine (LANTUS PEN) 100 UNIT/ML pen Inject 28 Units Subcutaneous At Bedtime   Unknown, Entered By History   insulin pen needle (BD ALIN U/F) 32G X 4 MM Use 8 daily or as directed.   Sharlene Maceil APRN CNP       The information provided in this note is only as accurate as the sources  available at the time of update(s)

## 2022-03-30 LAB
ALBUMIN SERPL-MCNC: 3.1 G/DL (ref 3.4–5)
ALP SERPL-CCNC: 112 U/L (ref 65–260)
ALT SERPL W P-5'-P-CCNC: 36 U/L (ref 0–50)
ANION GAP SERPL CALCULATED.3IONS-SCNC: 8 MMOL/L (ref 3–14)
AST SERPL W P-5'-P-CCNC: 24 U/L (ref 0–35)
BASOPHILS # BLD AUTO: 0.1 10E3/UL (ref 0–0.2)
BASOPHILS NFR BLD AUTO: 1 %
BILIRUB SERPL-MCNC: 0.3 MG/DL (ref 0.2–1.3)
BUN SERPL-MCNC: 10 MG/DL (ref 7–30)
CALCIUM SERPL-MCNC: 8.8 MG/DL (ref 8.5–10.1)
CHLORIDE BLD-SCNC: 110 MMOL/L (ref 98–110)
CO2 SERPL-SCNC: 18 MMOL/L (ref 20–32)
CREAT SERPL-MCNC: 0.76 MG/DL (ref 0.5–1)
EOSINOPHIL # BLD AUTO: 0.1 10E3/UL (ref 0–0.7)
EOSINOPHIL NFR BLD AUTO: 2 %
ERYTHROCYTE [DISTWIDTH] IN BLOOD BY AUTOMATED COUNT: 13.2 % (ref 10–15)
GFR SERPL CREATININE-BSD FRML MDRD: >90 ML/MIN/1.73M2
GLUCOSE BLD-MCNC: 121 MG/DL (ref 70–99)
GLUCOSE BLDC GLUCOMTR-MCNC: 101 MG/DL (ref 70–99)
GLUCOSE BLDC GLUCOMTR-MCNC: 116 MG/DL (ref 70–99)
GLUCOSE BLDC GLUCOMTR-MCNC: 131 MG/DL (ref 70–99)
GLUCOSE BLDC GLUCOMTR-MCNC: 140 MG/DL (ref 70–99)
GLUCOSE BLDC GLUCOMTR-MCNC: 143 MG/DL (ref 70–99)
GLUCOSE BLDC GLUCOMTR-MCNC: 172 MG/DL (ref 70–99)
GLUCOSE BLDC GLUCOMTR-MCNC: 192 MG/DL (ref 70–99)
GLUCOSE BLDC GLUCOMTR-MCNC: 223 MG/DL (ref 70–99)
GLUCOSE BLDC GLUCOMTR-MCNC: 241 MG/DL (ref 70–99)
GLUCOSE BLDC GLUCOMTR-MCNC: 257 MG/DL (ref 70–99)
GLUCOSE BLDC GLUCOMTR-MCNC: 400 MG/DL (ref 70–99)
GLUCOSE BLDC GLUCOMTR-MCNC: 433 MG/DL (ref 70–99)
GLUCOSE BLDC GLUCOMTR-MCNC: 441 MG/DL (ref 70–99)
GLUCOSE BLDC GLUCOMTR-MCNC: 452 MG/DL (ref 70–99)
GLUCOSE BLDC GLUCOMTR-MCNC: 90 MG/DL (ref 70–99)
HCT VFR BLD AUTO: 41.1 % (ref 40–53)
HGB BLD-MCNC: 14.1 G/DL (ref 13.3–17.7)
IMM GRANULOCYTES # BLD: 0 10E3/UL
IMM GRANULOCYTES NFR BLD: 0 %
KETONES BLD-SCNC: 0.4 MMOL/L (ref 0–0.6)
LYMPHOCYTES # BLD AUTO: 2.5 10E3/UL (ref 0.8–5.3)
LYMPHOCYTES NFR BLD AUTO: 43 %
MAGNESIUM SERPL-MCNC: 2.3 MG/DL (ref 1.6–2.3)
MCH RBC QN AUTO: 27.7 PG (ref 26.5–33)
MCHC RBC AUTO-ENTMCNC: 34.3 G/DL (ref 31.5–36.5)
MCV RBC AUTO: 81 FL (ref 78–100)
MONOCYTES # BLD AUTO: 0.7 10E3/UL (ref 0–1.3)
MONOCYTES NFR BLD AUTO: 12 %
NEUTROPHILS # BLD AUTO: 2.5 10E3/UL (ref 1.6–8.3)
NEUTROPHILS NFR BLD AUTO: 42 %
NRBC # BLD AUTO: 0 10E3/UL
NRBC BLD AUTO-RTO: 0 /100
PLAT MORPH BLD: ABNORMAL
PLATELET # BLD AUTO: 305 10E3/UL (ref 150–450)
POTASSIUM BLD-SCNC: 2.9 MMOL/L (ref 3.4–5.3)
POTASSIUM BLD-SCNC: 4.3 MMOL/L (ref 3.4–5.3)
PROT SERPL-MCNC: 6.9 G/DL (ref 6.8–8.8)
RBC # BLD AUTO: 5.09 10E6/UL (ref 4.4–5.9)
RBC MORPH BLD: ABNORMAL
SMUDGE CELLS BLD QL SMEAR: PRESENT
SODIUM SERPL-SCNC: 136 MMOL/L (ref 133–144)
TSH SERPL DL<=0.005 MIU/L-ACNC: 0.97 MU/L (ref 0.4–4)
VARIANT LYMPHS BLD QL SMEAR: PRESENT
WBC # BLD AUTO: 6 10E3/UL (ref 4–11)

## 2022-03-30 PROCEDURE — 82010 KETONE BODYS QUAN: CPT | Performed by: HOSPITALIST

## 2022-03-30 PROCEDURE — 84443 ASSAY THYROID STIM HORMONE: CPT | Performed by: HOSPITALIST

## 2022-03-30 PROCEDURE — 250N000013 HC RX MED GY IP 250 OP 250 PS 637: Performed by: INTERNAL MEDICINE

## 2022-03-30 PROCEDURE — 83735 ASSAY OF MAGNESIUM: CPT | Performed by: INTERNAL MEDICINE

## 2022-03-30 PROCEDURE — 85025 COMPLETE CBC W/AUTO DIFF WBC: CPT | Performed by: HOSPITALIST

## 2022-03-30 PROCEDURE — 120N000001 HC R&B MED SURG/OB

## 2022-03-30 PROCEDURE — 80053 COMPREHEN METABOLIC PANEL: CPT | Performed by: HOSPITALIST

## 2022-03-30 PROCEDURE — 258N000003 HC RX IP 258 OP 636: Performed by: NURSE PRACTITIONER

## 2022-03-30 PROCEDURE — 36415 COLL VENOUS BLD VENIPUNCTURE: CPT | Performed by: INTERNAL MEDICINE

## 2022-03-30 PROCEDURE — 84132 ASSAY OF SERUM POTASSIUM: CPT | Performed by: INTERNAL MEDICINE

## 2022-03-30 PROCEDURE — 36415 COLL VENOUS BLD VENIPUNCTURE: CPT | Performed by: HOSPITALIST

## 2022-03-30 PROCEDURE — 99233 SBSQ HOSP IP/OBS HIGH 50: CPT | Performed by: INTERNAL MEDICINE

## 2022-03-30 PROCEDURE — 250N000012 HC RX MED GY IP 250 OP 636 PS 637: Performed by: NURSE PRACTITIONER

## 2022-03-30 PROCEDURE — 250N000012 HC RX MED GY IP 250 OP 636 PS 637: Performed by: INTERNAL MEDICINE

## 2022-03-30 RX ORDER — SODIUM CHLORIDE AND POTASSIUM CHLORIDE 150; 900 MG/100ML; MG/100ML
INJECTION, SOLUTION INTRAVENOUS CONTINUOUS
Status: DISCONTINUED | OUTPATIENT
Start: 2022-03-30 | End: 2022-03-30

## 2022-03-30 RX ORDER — DEXTROSE MONOHYDRATE 25 G/50ML
25-50 INJECTION, SOLUTION INTRAVENOUS
Status: DISCONTINUED | OUTPATIENT
Start: 2022-03-30 | End: 2022-03-31 | Stop reason: HOSPADM

## 2022-03-30 RX ORDER — POTASSIUM CHLORIDE 1500 MG/1
20 TABLET, EXTENDED RELEASE ORAL ONCE
Status: COMPLETED | OUTPATIENT
Start: 2022-03-30 | End: 2022-03-30

## 2022-03-30 RX ORDER — POTASSIUM CHLORIDE 1500 MG/1
40 TABLET, EXTENDED RELEASE ORAL ONCE
Status: COMPLETED | OUTPATIENT
Start: 2022-03-30 | End: 2022-03-30

## 2022-03-30 RX ORDER — NICOTINE POLACRILEX 4 MG
15-30 LOZENGE BUCCAL
Status: DISCONTINUED | OUTPATIENT
Start: 2022-03-30 | End: 2022-03-31 | Stop reason: HOSPADM

## 2022-03-30 RX ADMIN — SODIUM CHLORIDE: 9 INJECTION, SOLUTION INTRAVENOUS at 02:32

## 2022-03-30 RX ADMIN — INSULIN ASPART: 100 INJECTION, SOLUTION INTRAVENOUS; SUBCUTANEOUS at 14:23

## 2022-03-30 RX ADMIN — INSULIN GLARGINE 25 UNITS: 100 INJECTION, SOLUTION SUBCUTANEOUS at 11:24

## 2022-03-30 RX ADMIN — POTASSIUM CHLORIDE 20 MEQ: 1500 TABLET, EXTENDED RELEASE ORAL at 13:47

## 2022-03-30 RX ADMIN — INSULIN ASPART 3 UNITS: 100 INJECTION, SOLUTION INTRAVENOUS; SUBCUTANEOUS at 17:06

## 2022-03-30 RX ADMIN — SODIUM CHLORIDE 1.5 UNITS/HR: 9 INJECTION, SOLUTION INTRAVENOUS at 07:51

## 2022-03-30 RX ADMIN — POTASSIUM CHLORIDE, DEXTROSE MONOHYDRATE AND SODIUM CHLORIDE: 150; 5; 450 INJECTION, SOLUTION INTRAVENOUS at 01:18

## 2022-03-30 RX ADMIN — POTASSIUM CHLORIDE 40 MEQ: 1500 TABLET, EXTENDED RELEASE ORAL at 11:26

## 2022-03-30 ASSESSMENT — ACTIVITIES OF DAILY LIVING (ADL)
ADLS_ACUITY_SCORE: 5

## 2022-03-30 NOTE — PROVIDER NOTIFICATION
"Paged on-call hospitalist MARCELO Huynh at 2021, \"Pt SP rm [phone] is requesting diet. On insulin gtt for DKA, per day hosp pt can eat if anion gap improved, gap down to 13. Any orders? Thanks! -NIMA Willard *[id number]\". Mod CHO diet ordered.  "

## 2022-03-30 NOTE — PROGRESS NOTES
Hospitalist TREVOR cross cover:    Paged by nursing as pt requesting diet.  Nursing notes admitting hospitalist had stated if pt's anion gap closes, ok to resume diet at that time.  Nursing notes pt awake, alert, ambulating around, mentation intact.  Diabetic diet ordered.    DESIREE Jacques, CNP  Hospitalist TREVOR    No charge.

## 2022-03-30 NOTE — PROVIDER NOTIFICATION
Notified Dr Leon regarding . Additional sliding scale was ordered, but recheck still was elevated at 400s. Per MD will increased the dose and monitor/eval for further need for changes. Per Dr Leon, no transfer tonight.

## 2022-03-30 NOTE — CONSULTS
Care Management Initial Consult    General Information  Assessment completed with: Patient,    Type of CM/SW Visit: Initial Assessment    Primary Care Provider verified and updated as needed: Yes (Patient does not have a PCP but requests assistance )   Readmission within the last 30 days: no previous admission in last 30 days      Reason for Consult: discharge planning, community resources  Advance Care Planning:            Communication Assessment  Patient's communication style: spoken language (English or Bilingual)    Hearing Difficulty or Deaf: no   Wear Glasses or Blind: no    Cognitive  Cognitive/Neuro/Behavioral: WDL  Level of Consciousness: alert  Arousal Level: opens eyes spontaneously  Orientation: oriented x 4  Mood/Behavior: flat affect  Best Language: 0 - No aphasia  Speech: clear, spontaneous, logical    Living Environment:   People in home: alone     Current living Arrangements: apartment      Able to return to prior arrangements: yes  Living Arrangement Comments: Patient lives in an apartment alone with limited resources    Family/Social Support:  Care provided by: self  Provides care for: no one  Marital Status: Single  Significant Other          Description of Support System: Supportive    Support Assessment: Adequate social supports    Current Resources:   Patient receiving home care services:       Community Resources:    Equipment currently used at home: none  Supplies currently used at home:      Employment/Financial:  Employment Status:          Financial Concerns: No concerns identified           Lifestyle & Psychosocial Needs:  Social Determinants of Health     Tobacco Use: High Risk     Smoking Tobacco Use: Current Every Day Smoker     Smokeless Tobacco Use: Never Used   Alcohol Use: Not on file   Financial Resource Strain: Not on file   Food Insecurity: Not on file   Transportation Needs: Not on file   Physical Activity: Not on file   Stress: Not on file   Social Connections: Not on file    Intimate Partner Violence: Not on file   Depression: Not on file   Housing Stability: Not on file       Functional Status:  Prior to admission patient needed assistance:              Mental Health Status:          Chemical Dependency Status:                Values/Beliefs:  Spiritual, Cultural Beliefs, Adventist Practices, Values that affect care:                 Additional Information:  Writer received consult for discharge planning and community resources. Writer met with patient and introduced self and role. Patient states he needs help before he can discharge and states he is worried about accessing his medications. Patient hopes that he can discharge soon but needs his medication filled here and then set up so he keeps getting it. Patient states he does not have insulin at home or lantus. Patient states he has not had lantus for some time but that is because he does not have a doctor to prescribe outpatient. Patient states he would be open to getting established with a PCP near his home. Patient states he has good insurance and coverage for his medications but when he runs out he cannot fill them without seeing a doctor. Patient states he has no other concerns and is connected with the [facility] already for resources.     SW notified Care Coordinator about need for PCP Follow up.     ONEYDA Dean

## 2022-03-30 NOTE — PLAN OF CARE
Pt admitted at approx 1200 for Diabetic Ketoacidosis. Alert and oriented x 4, lethargic at times, more awake throughout shift. Neurological status intact. Vital signs stable on room air except for tachycardia 110s to 130s. Independent in room. NPO except for sips of water. Lung sounds clear. Bowel sounds normoactive, passing flatus, no BM today. Adequate urine output. Skin clean and intact. Denies pain. Denies nausea. Tele Sinus Tachycardia. Drips: Insulin and fluids.

## 2022-03-30 NOTE — PROVIDER NOTIFICATION
"Paged hospitalist Sandra at [phone]     \"Also, FYI, 329 Pay, S.  Ketone critical value of 3.0, down from 4.3.    Thanks!  NIMA Payne  *[phone]\"    Will continue to follow plan of care.  "

## 2022-03-30 NOTE — PROGRESS NOTES
[facility]    Medicine Progress Note - Hospitalist Service       Date of Admission:  3/29/2022    Assessment & Plan   Lauro Maddox is a 21 year old male with hx of DM1, non-compliance, and recurrent hospitalizations for DKA who was admitted on 3/29/2022 for DKA    DKA, Resolved  DM1 without complications  Recurrent hospitalizations due to DKA  * A1c 13  * Has Rx for Lantus 28 units qhs and aspart 75 units daily, but has not filled since July 2021  * Non-compliant, some concern for unstable living circumstances  * Presented on this admission (3/29) with nausea/vomiting.  on arrival; HCO3 5, AG 26. On admission, he was initiated on an insulin infusion and IV fluids per DKA protocol with improvement  - d/c insulin gtt today and start basal-bolus regimen: Lantus 25 units daily, aspart 1u per 15g CHO  - Medium SSI available  -  consult for community resources, psychosocial assessment    Hyponatremia, Resolved  Na 128 on arrival, corrected with IV fluids    Hypokalemia  - On potassium replacement protocol     Diet: Consistent Carbohydrate Diet Moderate Consistent Carb (60 g CHO per Meal) Diet    DVT Prophylaxis: Pneumatic Compression Devices  Wilson Catheter: Not present  Code Status: Full Code         Disposition: Expected discharge tomorrow if blood sugars are stable     The patient's care was discussed with the Bedside Nurse and Patient.    Ave Leon MD  Hospitalist Service  [facility]  Contact information available via [facility] Paging/Directory    ______________________________________________________________________    Interval History   No events overnight. Offers no complaints this morning - nausea/vomiting resolved. Denies cp/sob, dizziness/lightheadedness. Was going to leave AMA, but I was finally able to convince him to stay by agreeing to have his IV taken out    Time Spent on this Encounter   I spent 35 minutes on the unit/floor managing the  care of Lauro Maddox. Over 50% of my time was spent on the following:   - Counseling the patient and/or family regarding: diagnostic results, prognosis, risks and benefits of treatment options, medical compliance and prevention of disease  - Coordination of care with the: nurse    Ave Leon MD    Data reviewed today: I reviewed all medications, new labs and imaging results over the last 24 hours. I personally reviewed no images or EKG's today.    Physical Exam   Vital Signs: Temp: 97.6  F (36.4  C) Temp src: Oral BP: 118/76 Pulse: 93   Resp: 16 SpO2: 99 % O2 Device: None (Room air)    Weight: 143 lbs 8 oz  Constitutional: Appears comfortable  HEENT: Sclera white, MMM  Respiratory: Breathing non-labored. Lungs CTAB - no wheezes, crackles, or rhonchi  Cardiovascular: Heart RRR, no m/r/g. No pedal edema  GI: +BS, abd soft/NT  Skin/Integument: No rash  Musculoskeletal: Normal muscle bulk and tone  Neuro: Alert and appropriate, TOM  Psych: Calm and cooperative    Data   Recent Labs   Lab 03/30/22  0849 03/30/22  0748 03/30/22  0628 03/30/22  0531 03/29/22  1945 03/29/22  1912 03/29/22  1332 03/29/22  1306 03/29/22  1232 03/29/22  1149 03/29/22  0910 03/29/22  0813   WBC  --   --   --  6.0  --   --   --   --   --   --   --  11.5*   HGB  --   --   --  14.1  --   --   --   --   --   --   --  16.5   MCV  --   --   --  81  --   --   --   --   --   --   --  87   PLT  --   --   --  305  --   --   --   --   --   --   --  352   NA  --   --   --  136  --  131*  --  131*  --  130*   < > 128*   POTASSIUM  --   --   --  2.9*  --  4.2  --  4.2  --  4.2   < > 5.0   CHLORIDE  --   --   --  110  --  104  --  105  --  104   < > 97*   CO2  --   --   --  18*  --  14*  --  9*  --  8*   < > 5*   BUN  --   --   --  10  --  9  --  11  --  12   < > 15   CR  --   --   --  0.76  --  0.79  --  0.81  --  0.85   < > 0.95   ANIONGAP  --   --   --  8  --  13  --  17*  --  18*   < > 26*   URBAN  --   --   --  8.8  --  8.6  --  8.3*  --  8.2*    < > 8.9   * 116* 90 121*   < > 140*   < > 174*   < > 223*   < > 488*   ALBUMIN  --   --   --  3.1*  --   --   --   --   --  3.5  --   --    PROTTOTAL  --   --   --  6.9  --   --   --   --   --  8.1  --   --    BILITOTAL  --   --   --  0.3  --   --   --   --   --  0.5  --   --    ALKPHOS  --   --   --  112  --   --   --   --   --  142  --   --    ALT  --   --   --  36  --   --   --   --   --  44  --   --    AST  --   --   --  24  --   --   --   --   --  26  --   --     < > = values in this interval not displayed.         No results found for this or any previous visit (from the past 24 hour(s)).    Medications     insulin (regular) Stopped (03/30/22 0930)       insulin aspart   Subcutaneous TID w/meals     insulin aspart  1-7 Units Subcutaneous TID AC     insulin aspart  1-5 Units Subcutaneous At Bedtime     insulin glargine  25 Units Subcutaneous QAM AC     potassium chloride  20 mEq Oral Once     potassium chloride  40 mEq Oral Once

## 2022-03-30 NOTE — PROGRESS NOTES
Pt A&O, making needs known. VSS on RA. Tele SR. IV removed as ordered/pts request. Insulin gtt off, attempting to correct with sliding scale/long acting, BS high, plan to eval the need to transition back to insulin gtt. Pt refusing IV placement, but considering allowance now. Ambulating, using bathroom. BM x2. Will cont to closely monitor.

## 2022-03-30 NOTE — PLAN OF CARE
A&O x 4. VSS on RA. Tele: NSR. Up and ambulating independently. Mod carb diet, tolerating well. Peripheral IV's in BUE, right IV is saline locked, left IV is running fluids at 75mL/hr with insulin drip. Blood sugars labile overnight due to pt diet, last BG 99.  Ketones 0.4, anion gap 8.   Pt denies pain and nausea. Voiding in urinal. No BM this shift. Continue plan of care.

## 2022-03-31 VITALS
BODY MASS INDEX: 18.42 KG/M2 | HEART RATE: 78 BPM | DIASTOLIC BLOOD PRESSURE: 70 MMHG | TEMPERATURE: 98.2 F | OXYGEN SATURATION: 97 % | HEIGHT: 74 IN | RESPIRATION RATE: 63 BRPM | WEIGHT: 143.5 LBS | SYSTOLIC BLOOD PRESSURE: 110 MMHG

## 2022-03-31 LAB
ANION GAP SERPL CALCULATED.3IONS-SCNC: 7 MMOL/L (ref 3–14)
BUN SERPL-MCNC: 10 MG/DL (ref 7–30)
CALCIUM SERPL-MCNC: 8.7 MG/DL (ref 8.5–10.1)
CHLORIDE BLD-SCNC: 106 MMOL/L (ref 98–110)
CO2 SERPL-SCNC: 22 MMOL/L (ref 20–32)
CREAT SERPL-MCNC: 0.68 MG/DL (ref 0.5–1)
GFR SERPL CREATININE-BSD FRML MDRD: >90 ML/MIN/1.73M2
GLUCOSE BLD-MCNC: 223 MG/DL (ref 70–99)
GLUCOSE BLDC GLUCOMTR-MCNC: 179 MG/DL (ref 70–99)
POTASSIUM BLD-SCNC: 3.5 MMOL/L (ref 3.4–5.3)
SODIUM SERPL-SCNC: 135 MMOL/L (ref 133–144)

## 2022-03-31 PROCEDURE — 36415 COLL VENOUS BLD VENIPUNCTURE: CPT | Performed by: INTERNAL MEDICINE

## 2022-03-31 PROCEDURE — 99239 HOSP IP/OBS DSCHRG MGMT >30: CPT | Performed by: INTERNAL MEDICINE

## 2022-03-31 PROCEDURE — 80048 BASIC METABOLIC PNL TOTAL CA: CPT | Performed by: INTERNAL MEDICINE

## 2022-03-31 ASSESSMENT — ACTIVITIES OF DAILY LIVING (ADL)
ADLS_ACUITY_SCORE: 5

## 2022-03-31 NOTE — PLAN OF CARE
Pt. Alert and oriented x4. VSS, on RA. Ind. Tolerating Mod carb diet. Lung sounds clear. Bowel sounds normoactive, passing flatus. No BM on shift, voiding adequately. Denies pain or nausea. BG @HS stable. Off tele monitor.

## 2022-03-31 NOTE — DISCHARGE SUMMARY
[facility]  Hospitalist Discharge Summary      Date of Admission:  3/29/2022  Date of Discharge:  3/31/2022  Discharging Provider: Ave Leon MD    Discharge Diagnoses   1. DKA, Resolved  2. DM1 with hyperglycemia  3. Recurrent h ospitalizations due to DKA  4. Medical non-compliance    Follow-ups Needed After Discharge   Follow-up Appointments     Follow-up and recommended labs and tests       Follow up with primary care provider, Akin Wright, within 7   days for hospital follow-up.           Unresulted Labs Ordered in the Past 30 Days of this Admission     Date and Time Order Name Status Description    3/29/2022  7:50 AM Blood Culture Peripheral Blood Preliminary     3/29/2022  7:50 AM Blood Culture Peripheral Blood Preliminary       These results will be followed up by me    Discharge Disposition   Discharged to home  Condition at discharge: Stable    Hospital Course   Lauro Maddox is a 21 year old male with hx of DM1, non-compliance, and recurrent hospitalizations for DKA who was admitted on 3/29/2022 for DKA    DKA, Resolved  DM1 without complications  Recurrent hospitalizations due to DKA  * A1c 13  * Has Rx for Lantus 28 units qhs and aspart 75 units daily, but has not filled since July 2021  * Non-compliant, some concern for unstable living circumstances  * Presented on this admission (3/29) with nausea/vomiting.  on arrival; HCO3 5, AG 26. On admission, he was initiated on an insulin infusion and IV fluids per DKA protocol with improvement  - He will be discharged with Lantus 34 units daily and aspart 8 units TID AC  - I offered to order a new glucometer, but patient assures me he has one at home  - Patient was seen by SW prior to discharge; he has community resources at home  Recent Labs   Lab 03/31/22  0532 03/31/22  0220 03/30/22  2123 03/30/22  1821 03/30/22  1704 03/30/22  1625   * 179* 172* 452* 433* 400*       Hyponatremia, Resolved  Na 128  on arrival, corrected with IV fluids    Hypokalemia, Resolved  Potassium was replaced per protocol    Consultations This Hospital Stay   SOCIAL WORK IP CONSULT  CARE MANAGEMENT / SOCIAL WORK IP CONSULT    Code Status   Full Code    Time Spent on this Encounter   I, Ave Leon MD, personally saw the patient today and spent 35 minutes discharging this patient.       Ave Leon MD  [facility]  [address] [place] [address]  Phone: [phone]  ______________________________________________________________________    Physical Exam   Vital Signs: Temp: 98.2  F (36.8  C) Temp src: Oral BP: 110/70 Pulse: 78   Resp: (!) 63 SpO2: 97 % O2 Device: None (Room air)    Weight: 143 lbs 8 oz    Constitutional: Appears comfortable  HEENT: Sclera white, conjunctiva clear, EOMI, MMM  Respiratory: Breathing non-labored. Lungs CTAB - no wheezes/crackles/rhonchi  Cardiovascular: Heart RRR, no m/r/g. No pedal edema.   GI: +BS. Abd soft/NT  Skin: Warm and dry. No rash.  Musculoskeletal: Normal muscle bulk and tone  Neurologic: Alert and appropriate. TOM  Psychiatric: Calm and cooperative    Primary Care Physician   No primary care provider on file.    Discharge Orders      Reason for your hospital stay    Diabetic ketoacidosis due to non-compliance with insulin     Follow-up and recommended labs and tests     Follow up with primary care provider, Akin Wright, within 7 days for hospital follow-up.     Activity    Your activity upon discharge: activity as tolerated     Diet    Follow this diet upon discharge: Diabetes diet       Significant Results and Procedures   Most Recent 3 BMP's:Recent Labs   Lab Test 03/31/22  0532 03/31/22  0220 03/30/22 2123 03/30/22  1923 03/30/22  0628 03/30/22  0531 03/29/22  1945 03/29/22  1912     --   --   --   --  136  --  131*   POTASSIUM 3.5  --   --  4.3  --  2.9*  --  4.2   CHLORIDE 106  --   --   --   --  110  --  104   CO2  22  --   --   --   --  18*  --  14*   BUN 10  --   --   --   --  10  --  9   CR 0.68  --   --   --   --  0.76  --  0.79   ANIONGAP 7  --   --   --   --  8  --  13   URBAN 8.7  --   --   --   --  8.8  --  8.6   * 179* 172*  --    < > 121*   < > 140*    < > = values in this interval not displayed.   , No results found for this or any previous visit.    Discharge Medications   Discharge Medication List as of 3/31/2022  7:46 AM      CONTINUE these medications which have CHANGED    Details   glucagon 1 MG kit 1mg injection for severe hypoglycemica involving loss of consciousness or seizure., Disp-1 each, R-11, E-Prescribe      insulin aspart (NOVOLOG PEN) 100 UNIT/ML pen Inject 8 Units Subcutaneous 3 times daily (with meals), Disp-15 mL, R-0, E-Prescribe      insulin glargine (LANTUS PEN) 100 UNIT/ML pen Inject 34 Units Subcutaneous every morning, Disp-15 mL, R-3, E-PrescribeIf Lantus is not covered by insurance, may substitute Basaglar or Semglee or other insulin glargine product per insurance preference at same dose and frequency.           CONTINUE these medications which have NOT CHANGED    Details   acetone, Urine, test STRP Test ketones when sick or when have 2 blood sugars in a row >300, Disp-50 each, R-11, E-Prescribe      blood glucose (ACCU-CHEK FASTCLIX) lancing device Device to be used with lancets., Disp-2 each, R-0, E-Prescribe      blood glucose monitoring (ACCU-CHEK JULIO C PLUS) meter device kit Use to test blood sugars 6-8 times daily or as directed.Disp-1 kit, [id number]      blood glucose monitoring (ACCU-CHEK JULIO C PLUS) test strip Use to test blood sugar 10 times daily or as directed. PA approved 8/7/17-9/7/20, Disp-300 strip, R-11, E-Prescribe      blood glucose monitoring (ACCU-CHEK FASTCLIX) lancets 6 each daily, Disp-204 each, R-11, E-Prescribe      insulin pen needle (BD ALIN U/F) 32G X 4 MM Use 8 daily or as directed.Disp-200 each, [id number]           Allergies   No Known  Allergies

## 2022-03-31 NOTE — PLAN OF CARE
Goal Outcome Evaluation:A&O x 4. VSS on RA. Tele: NSR. Up and ambulating independently. Mod carb diet, tolerating well. Pt. Discharged to home.  Gave medications and discharge paperwork.  Pt. Left with significant other.

## 2022-04-01 ENCOUNTER — PATIENT OUTREACH (OUTPATIENT)
Dept: CARE COORDINATION | Facility: CLINIC | Age: 22
End: 2022-04-01

## 2022-04-01 DIAGNOSIS — Z71.89 OTHER SPECIFIED COUNSELING: ICD-10-CM

## 2022-04-01 NOTE — PROGRESS NOTES
Clinic Care Coordination Contact  [facility]       Clinical Data: Care Coordinator Outreach  Outreach attempted x 2.  Left message on patient's voicemail with call back information and requested return call.  Plan:Care Coordinator will try to reach patient again in 1-2 business days.    MIGUEL Rivera  [phone]  [facility]                    Clinic Care Coordination Contact  [facility]       Clinical Data: Care Coordinator Outreach  Outreach attempted x 1.  Left message on patient's voicemail with call back information and requested return call.  Plan:Care Coordinator will try to reach patient again in 1-2 business days.    MIGUEL Rivera  [phone]  [facility]

## 2022-04-03 LAB
BACTERIA BLD CULT: NO GROWTH
BACTERIA BLD CULT: NO GROWTH

## 2022-04-07 LAB
ATRIAL RATE - MUSE: 105 BPM
DIASTOLIC BLOOD PRESSURE - MUSE: NORMAL MMHG
INTERPRETATION ECG - MUSE: NORMAL
P AXIS - MUSE: 69 DEGREES
PR INTERVAL - MUSE: 160 MS
QRS DURATION - MUSE: 88 MS
QT - MUSE: 362 MS
QTC - MUSE: 478 MS
R AXIS - MUSE: 65 DEGREES
SYSTOLIC BLOOD PRESSURE - MUSE: NORMAL MMHG
T AXIS - MUSE: 66 DEGREES
VENTRICULAR RATE- MUSE: 105 BPM

## 2023-03-16 ENCOUNTER — APPOINTMENT (OUTPATIENT)
Dept: GENERAL RADIOLOGY | Facility: CLINIC | Age: 23
End: 2023-03-16
Attending: EMERGENCY MEDICINE
Payer: COMMERCIAL

## 2023-03-16 ENCOUNTER — HOSPITAL ENCOUNTER (INPATIENT)
Facility: CLINIC | Age: 23
LOS: 3 days | Discharge: HOME | End: 2023-03-20
Attending: EMERGENCY MEDICINE | Admitting: INTERNAL MEDICINE
Payer: COMMERCIAL

## 2023-03-16 DIAGNOSIS — R73.9 HYPERGLYCEMIA: ICD-10-CM

## 2023-03-16 DIAGNOSIS — F11.10 OPIATE ABUSE, CONTINUOUS (H): ICD-10-CM

## 2023-03-16 LAB
B-OH-BUTYR SERPL-SCNC: 1 MMOL/L
BASOPHILS # BLD AUTO: 0.1 10E3/UL (ref 0–0.2)
BASOPHILS NFR BLD AUTO: 1 %
EOSINOPHIL # BLD AUTO: 0 10E3/UL (ref 0–0.7)
EOSINOPHIL NFR BLD AUTO: 0 %
ERYTHROCYTE [DISTWIDTH] IN BLOOD BY AUTOMATED COUNT: 14.2 % (ref 10–15)
ETHANOL SERPL-MCNC: <0.01 G/DL
GLUCOSE BLDC GLUCOMTR-MCNC: >600 MG/DL (ref 70–99)
HCO3 BLDV-SCNC: 26 MMOL/L (ref 21–28)
HCT VFR BLD AUTO: 38.3 % (ref 40–53)
HGB BLD-MCNC: 12.3 G/DL (ref 13.3–17.7)
IMM GRANULOCYTES # BLD: 0 10E3/UL
IMM GRANULOCYTES NFR BLD: 0 %
LACTATE BLD-SCNC: 2 MMOL/L
LIPASE SERPL-CCNC: 25 U/L (ref 13–60)
LYMPHOCYTES # BLD AUTO: 2.6 10E3/UL (ref 0.8–5.3)
LYMPHOCYTES NFR BLD AUTO: 37 %
MCH RBC QN AUTO: 28.7 PG (ref 26.5–33)
MCHC RBC AUTO-ENTMCNC: 32.1 G/DL (ref 31.5–36.5)
MCV RBC AUTO: 90 FL (ref 78–100)
MONOCYTES # BLD AUTO: 0.5 10E3/UL (ref 0–1.3)
MONOCYTES NFR BLD AUTO: 7 %
NEUTROPHILS # BLD AUTO: 3.7 10E3/UL (ref 1.6–8.3)
NEUTROPHILS NFR BLD AUTO: 55 %
NRBC # BLD AUTO: 0 10E3/UL
NRBC BLD AUTO-RTO: 0 /100
PCO2 BLDV: 45 MM HG (ref 40–50)
PH BLDV: 7.37 [PH] (ref 7.32–7.43)
PLATELET # BLD AUTO: 338 10E3/UL (ref 150–450)
PO2 BLDV: 65 MM HG (ref 25–47)
RBC # BLD AUTO: 4.28 10E6/UL (ref 4.4–5.9)
SAO2 % BLDV: 92 % (ref 94–100)
TROPONIN T SERPL HS-MCNC: 24 NG/L
WBC # BLD AUTO: 6.8 10E3/UL (ref 4–11)

## 2023-03-16 PROCEDURE — 83036 HEMOGLOBIN GLYCOSYLATED A1C: CPT | Performed by: EMERGENCY MEDICINE

## 2023-03-16 PROCEDURE — 71045 X-RAY EXAM CHEST 1 VIEW: CPT

## 2023-03-16 PROCEDURE — 80053 COMPREHEN METABOLIC PANEL: CPT | Performed by: EMERGENCY MEDICINE

## 2023-03-16 PROCEDURE — 83735 ASSAY OF MAGNESIUM: CPT | Performed by: INTERNAL MEDICINE

## 2023-03-16 PROCEDURE — 82010 KETONE BODYS QUAN: CPT | Performed by: EMERGENCY MEDICINE

## 2023-03-16 PROCEDURE — 99285 EMERGENCY DEPT VISIT HI MDM: CPT | Mod: 25

## 2023-03-16 PROCEDURE — 82962 GLUCOSE BLOOD TEST: CPT

## 2023-03-16 PROCEDURE — 96360 HYDRATION IV INFUSION INIT: CPT

## 2023-03-16 PROCEDURE — 82077 ASSAY SPEC XCP UR&BREATH IA: CPT | Performed by: EMERGENCY MEDICINE

## 2023-03-16 PROCEDURE — 83690 ASSAY OF LIPASE: CPT | Performed by: EMERGENCY MEDICINE

## 2023-03-16 PROCEDURE — 36415 COLL VENOUS BLD VENIPUNCTURE: CPT | Performed by: EMERGENCY MEDICINE

## 2023-03-16 PROCEDURE — 85025 COMPLETE CBC W/AUTO DIFF WBC: CPT | Performed by: EMERGENCY MEDICINE

## 2023-03-16 PROCEDURE — 84484 ASSAY OF TROPONIN QUANT: CPT | Performed by: EMERGENCY MEDICINE

## 2023-03-16 PROCEDURE — 93005 ELECTROCARDIOGRAM TRACING: CPT

## 2023-03-16 PROCEDURE — 84100 ASSAY OF PHOSPHORUS: CPT | Performed by: INTERNAL MEDICINE

## 2023-03-16 PROCEDURE — 82803 BLOOD GASES ANY COMBINATION: CPT

## 2023-03-16 PROCEDURE — 258N000003 HC RX IP 258 OP 636: Performed by: EMERGENCY MEDICINE

## 2023-03-16 RX ORDER — SODIUM CHLORIDE, SODIUM LACTATE, POTASSIUM CHLORIDE, CALCIUM CHLORIDE 600; 310; 30; 20 MG/100ML; MG/100ML; MG/100ML; MG/100ML
INJECTION, SOLUTION INTRAVENOUS CONTINUOUS
Status: DISCONTINUED | OUTPATIENT
Start: 2023-03-17 | End: 2023-03-17

## 2023-03-16 RX ADMIN — SODIUM CHLORIDE, POTASSIUM CHLORIDE, SODIUM LACTATE AND CALCIUM CHLORIDE 1000 ML: 600; 310; 30; 20 INJECTION, SOLUTION INTRAVENOUS at 23:26

## 2023-03-16 RX ADMIN — SODIUM CHLORIDE, POTASSIUM CHLORIDE, SODIUM LACTATE AND CALCIUM CHLORIDE 1000 ML: 600; 310; 30; 20 INJECTION, SOLUTION INTRAVENOUS at 23:27

## 2023-03-16 ASSESSMENT — ENCOUNTER SYMPTOMS
CONFUSION: 1
NAUSEA: 0
DIFFICULTY URINATING: 0
DIARRHEA: 0
DYSURIA: 0

## 2023-03-17 PROBLEM — F11.10 OPIATE ABUSE, CONTINUOUS (H): Status: ACTIVE | Noted: 2023-03-17

## 2023-03-17 PROBLEM — R73.9 HYPERGLYCEMIA: Status: ACTIVE | Noted: 2023-03-17

## 2023-03-17 LAB
ALBUMIN SERPL BCG-MCNC: 3.7 G/DL (ref 3.5–5.2)
ALBUMIN UR-MCNC: NEGATIVE MG/DL
ALP SERPL-CCNC: 101 U/L (ref 40–129)
ALT SERPL W P-5'-P-CCNC: 39 U/L (ref 10–50)
AMPHETAMINES UR QL SCN: ABNORMAL
ANION GAP SERPL CALCULATED.3IONS-SCNC: 11 MMOL/L (ref 7–15)
ANION GAP SERPL CALCULATED.3IONS-SCNC: 11 MMOL/L (ref 7–15)
APPEARANCE UR: CLEAR
AST SERPL W P-5'-P-CCNC: 56 U/L (ref 10–50)
ATRIAL RATE - MUSE: 118 BPM
B-OH-BUTYR SERPL-SCNC: <0.2 MMOL/L
BARBITURATES UR QL SCN: ABNORMAL
BENZODIAZ UR QL SCN: ABNORMAL
BILIRUB SERPL-MCNC: 0.3 MG/DL
BILIRUB UR QL STRIP: NEGATIVE
BUN SERPL-MCNC: 21.2 MG/DL (ref 6–20)
BUN SERPL-MCNC: 31.6 MG/DL (ref 6–20)
BZE UR QL SCN: ABNORMAL
CALCIUM SERPL-MCNC: 8.4 MG/DL (ref 8.6–10)
CALCIUM SERPL-MCNC: 8.6 MG/DL (ref 8.6–10)
CANNABINOIDS UR QL SCN: ABNORMAL
CHLORIDE SERPL-SCNC: 100 MMOL/L (ref 98–107)
CHLORIDE SERPL-SCNC: 92 MMOL/L (ref 98–107)
COLOR UR AUTO: ABNORMAL
CREAT SERPL-MCNC: 0.6 MG/DL (ref 0.67–1.17)
CREAT SERPL-MCNC: 0.85 MG/DL (ref 0.67–1.17)
CRP SERPL-MCNC: <3 MG/L
DEPRECATED HCO3 PLAS-SCNC: 26 MMOL/L (ref 22–29)
DEPRECATED HCO3 PLAS-SCNC: 28 MMOL/L (ref 22–29)
DIASTOLIC BLOOD PRESSURE - MUSE: NORMAL MMHG
GFR SERPL CREATININE-BSD FRML MDRD: >90 ML/MIN/1.73M2
GFR SERPL CREATININE-BSD FRML MDRD: >90 ML/MIN/1.73M2
GLUCOSE BLDC GLUCOMTR-MCNC: 107 MG/DL (ref 70–99)
GLUCOSE BLDC GLUCOMTR-MCNC: 152 MG/DL (ref 70–99)
GLUCOSE BLDC GLUCOMTR-MCNC: 156 MG/DL (ref 70–99)
GLUCOSE BLDC GLUCOMTR-MCNC: 159 MG/DL (ref 70–99)
GLUCOSE BLDC GLUCOMTR-MCNC: 216 MG/DL (ref 70–99)
GLUCOSE BLDC GLUCOMTR-MCNC: 278 MG/DL (ref 70–99)
GLUCOSE BLDC GLUCOMTR-MCNC: 291 MG/DL (ref 70–99)
GLUCOSE BLDC GLUCOMTR-MCNC: 309 MG/DL (ref 70–99)
GLUCOSE BLDC GLUCOMTR-MCNC: 417 MG/DL (ref 70–99)
GLUCOSE SERPL-MCNC: 145 MG/DL (ref 70–99)
GLUCOSE SERPL-MCNC: 780 MG/DL (ref 70–99)
GLUCOSE UR STRIP-MCNC: >=1000 MG/DL
HBA1C MFR BLD: 13.4 %
HCO3 BLDV-SCNC: 26 MMOL/L (ref 21–28)
HGB UR QL STRIP: NEGATIVE
HOLD SPECIMEN: NORMAL
INTERPRETATION ECG - MUSE: NORMAL
KETONES UR STRIP-MCNC: NEGATIVE MG/DL
LACTATE BLD-SCNC: 3.4 MMOL/L
LACTATE SERPL-SCNC: 3.7 MMOL/L (ref 0.7–2)
LACTATE SERPL-SCNC: 3.8 MMOL/L (ref 0.7–2)
LEUKOCYTE ESTERASE UR QL STRIP: NEGATIVE
MAGNESIUM SERPL-MCNC: 1.9 MG/DL (ref 1.7–2.3)
NITRATE UR QL: NEGATIVE
OPIATES UR QL SCN: ABNORMAL
P AXIS - MUSE: 65 DEGREES
PCO2 BLDV: 49 MM HG (ref 40–50)
PCP QUAL URINE (ROCHE): ABNORMAL
PH BLDV: 7.33 [PH] (ref 7.32–7.43)
PH UR STRIP: 5 [PH] (ref 5–7)
PHOSPHATE SERPL-MCNC: 4.8 MG/DL (ref 2.5–4.5)
PO2 BLDV: 56 MM HG (ref 25–47)
POTASSIUM SERPL-SCNC: 3.8 MMOL/L (ref 3.4–5.3)
POTASSIUM SERPL-SCNC: 3.8 MMOL/L (ref 3.4–5.3)
POTASSIUM SERPL-SCNC: 4.5 MMOL/L (ref 3.4–5.3)
PR INTERVAL - MUSE: 166 MS
PROT SERPL-MCNC: 6.4 G/DL (ref 6.4–8.3)
QRS DURATION - MUSE: 84 MS
QT - MUSE: 324 MS
QTC - MUSE: 454 MS
R AXIS - MUSE: 106 DEGREES
RBC URINE: 0 /HPF
SAO2 % BLDV: 86 % (ref 94–100)
SODIUM SERPL-SCNC: 129 MMOL/L (ref 136–145)
SODIUM SERPL-SCNC: 139 MMOL/L (ref 136–145)
SP GR UR STRIP: 1.03 (ref 1–1.03)
SQUAMOUS EPITHELIAL: <1 /HPF
SYSTOLIC BLOOD PRESSURE - MUSE: NORMAL MMHG
T AXIS - MUSE: 61 DEGREES
UROBILINOGEN UR STRIP-MCNC: NORMAL MG/DL
VENTRICULAR RATE- MUSE: 118 BPM
WBC URINE: <1 /HPF

## 2023-03-17 PROCEDURE — 250N000012 HC RX MED GY IP 250 OP 636 PS 637: Performed by: INTERNAL MEDICINE

## 2023-03-17 PROCEDURE — 120N000001 HC R&B MED SURG/OB

## 2023-03-17 PROCEDURE — 258N000003 HC RX IP 258 OP 636: Performed by: EMERGENCY MEDICINE

## 2023-03-17 PROCEDURE — H0001 ALCOHOL AND/OR DRUG ASSESS: HCPCS

## 2023-03-17 PROCEDURE — 82010 KETONE BODYS QUAN: CPT | Performed by: INTERNAL MEDICINE

## 2023-03-17 PROCEDURE — 36415 COLL VENOUS BLD VENIPUNCTURE: CPT | Performed by: INTERNAL MEDICINE

## 2023-03-17 PROCEDURE — 82310 ASSAY OF CALCIUM: CPT | Performed by: INTERNAL MEDICINE

## 2023-03-17 PROCEDURE — 99207 PR APP CREDIT; MD BILLING SHARED VISIT: CPT | Performed by: STUDENT IN AN ORGANIZED HEALTH CARE EDUCATION/TRAINING PROGRAM

## 2023-03-17 PROCEDURE — 80307 DRUG TEST PRSMV CHEM ANLYZR: CPT | Performed by: EMERGENCY MEDICINE

## 2023-03-17 PROCEDURE — 99223 1ST HOSP IP/OBS HIGH 75: CPT | Mod: AI | Performed by: INTERNAL MEDICINE

## 2023-03-17 PROCEDURE — 86140 C-REACTIVE PROTEIN: CPT | Performed by: STUDENT IN AN ORGANIZED HEALTH CARE EDUCATION/TRAINING PROGRAM

## 2023-03-17 PROCEDURE — 83605 ASSAY OF LACTIC ACID: CPT | Performed by: INTERNAL MEDICINE

## 2023-03-17 PROCEDURE — 82962 GLUCOSE BLOOD TEST: CPT

## 2023-03-17 PROCEDURE — 250N000012 HC RX MED GY IP 250 OP 636 PS 637: Performed by: EMERGENCY MEDICINE

## 2023-03-17 PROCEDURE — 84132 ASSAY OF SERUM POTASSIUM: CPT | Performed by: INTERNAL MEDICINE

## 2023-03-17 PROCEDURE — 81003 URINALYSIS AUTO W/O SCOPE: CPT | Performed by: EMERGENCY MEDICINE

## 2023-03-17 PROCEDURE — 258N000003 HC RX IP 258 OP 636: Performed by: INTERNAL MEDICINE

## 2023-03-17 PROCEDURE — 82803 BLOOD GASES ANY COMBINATION: CPT

## 2023-03-17 RX ORDER — AMOXICILLIN 250 MG
1 CAPSULE ORAL 2 TIMES DAILY PRN
Status: DISCONTINUED | OUTPATIENT
Start: 2023-03-17 | End: 2023-03-20 | Stop reason: HOSPADM

## 2023-03-17 RX ORDER — INSULIN LISPRO 100 [IU]/ML
INJECTION, SOLUTION INTRAVENOUS; SUBCUTANEOUS
COMMUNITY

## 2023-03-17 RX ORDER — ACETAMINOPHEN 650 MG/1
650 SUPPOSITORY RECTAL EVERY 6 HOURS PRN
Status: DISCONTINUED | OUTPATIENT
Start: 2023-03-17 | End: 2023-03-20 | Stop reason: HOSPADM

## 2023-03-17 RX ORDER — LIDOCAINE 40 MG/G
CREAM TOPICAL
Status: DISCONTINUED | OUTPATIENT
Start: 2023-03-17 | End: 2023-03-20 | Stop reason: HOSPADM

## 2023-03-17 RX ORDER — AMOXICILLIN 250 MG
2 CAPSULE ORAL 2 TIMES DAILY PRN
Status: DISCONTINUED | OUTPATIENT
Start: 2023-03-17 | End: 2023-03-20 | Stop reason: HOSPADM

## 2023-03-17 RX ORDER — DEXTROSE MONOHYDRATE 25 G/50ML
25-50 INJECTION, SOLUTION INTRAVENOUS
Status: DISCONTINUED | OUTPATIENT
Start: 2023-03-17 | End: 2023-03-20 | Stop reason: HOSPADM

## 2023-03-17 RX ORDER — SODIUM CHLORIDE 9 MG/ML
INJECTION, SOLUTION INTRAVENOUS CONTINUOUS
Status: DISCONTINUED | OUTPATIENT
Start: 2023-03-17 | End: 2023-03-18

## 2023-03-17 RX ORDER — ONDANSETRON 2 MG/ML
4 INJECTION INTRAMUSCULAR; INTRAVENOUS EVERY 6 HOURS PRN
Status: DISCONTINUED | OUTPATIENT
Start: 2023-03-17 | End: 2023-03-20 | Stop reason: HOSPADM

## 2023-03-17 RX ORDER — ACETAMINOPHEN 325 MG/1
650 TABLET ORAL EVERY 6 HOURS PRN
Status: DISCONTINUED | OUTPATIENT
Start: 2023-03-17 | End: 2023-03-20 | Stop reason: HOSPADM

## 2023-03-17 RX ORDER — NICOTINE POLACRILEX 4 MG
15-30 LOZENGE BUCCAL
Status: DISCONTINUED | OUTPATIENT
Start: 2023-03-17 | End: 2023-03-20 | Stop reason: HOSPADM

## 2023-03-17 RX ORDER — ONDANSETRON 4 MG/1
4 TABLET, ORALLY DISINTEGRATING ORAL EVERY 6 HOURS PRN
Status: DISCONTINUED | OUTPATIENT
Start: 2023-03-17 | End: 2023-03-20 | Stop reason: HOSPADM

## 2023-03-17 RX ORDER — DEXTROSE MONOHYDRATE 25 G/50ML
25-50 INJECTION, SOLUTION INTRAVENOUS
Status: DISCONTINUED | OUTPATIENT
Start: 2023-03-17 | End: 2023-03-17

## 2023-03-17 RX ADMIN — SODIUM CHLORIDE, POTASSIUM CHLORIDE, SODIUM LACTATE AND CALCIUM CHLORIDE: 600; 310; 30; 20 INJECTION, SOLUTION INTRAVENOUS at 01:48

## 2023-03-17 RX ADMIN — INSULIN GLARGINE 28 UNITS: 100 INJECTION, SOLUTION SUBCUTANEOUS at 01:14

## 2023-03-17 RX ADMIN — SODIUM CHLORIDE 1000 ML: 9 INJECTION, SOLUTION INTRAVENOUS at 06:08

## 2023-03-17 RX ADMIN — INSULIN ASPART 4 UNITS: 100 INJECTION, SOLUTION INTRAVENOUS; SUBCUTANEOUS at 17:17

## 2023-03-17 RX ADMIN — SODIUM CHLORIDE 5.5 UNITS/HR: 9 INJECTION, SOLUTION INTRAVENOUS at 01:13

## 2023-03-17 RX ADMIN — SODIUM CHLORIDE: 9 INJECTION, SOLUTION INTRAVENOUS at 08:46

## 2023-03-17 RX ADMIN — INSULIN DETEMIR 28 UNITS: 100 INJECTION, SOLUTION SUBCUTANEOUS at 23:40

## 2023-03-17 RX ADMIN — INSULIN ASPART 6 UNITS: 100 INJECTION, SOLUTION INTRAVENOUS; SUBCUTANEOUS at 14:48

## 2023-03-17 ASSESSMENT — ACTIVITIES OF DAILY LIVING (ADL)
FALL_HISTORY_WITHIN_LAST_SIX_MONTHS: YES
TOILETING_ISSUES: NO
CONCENTRATING,_REMEMBERING_OR_MAKING_DECISIONS_DIFFICULTY: NO
ADLS_ACUITY_SCORE: 35
WEAR_GLASSES_OR_BLIND: NO
ADLS_ACUITY_SCORE: 20
ADLS_ACUITY_SCORE: 35
ADLS_ACUITY_SCORE: 20
ADLS_ACUITY_SCORE: 35
CHANGE_IN_FUNCTIONAL_STATUS_SINCE_ONSET_OF_CURRENT_ILLNESS/INJURY: NO
ADLS_ACUITY_SCORE: 20
ADLS_ACUITY_SCORE: 20
DRESSING/BATHING_DIFFICULTY: NO
WALKING_OR_CLIMBING_STAIRS_DIFFICULTY: NO
ADLS_ACUITY_SCORE: 20
DOING_ERRANDS_INDEPENDENTLY_DIFFICULTY: NO
NUMBER_OF_TIMES_PATIENT_HAS_FALLEN_WITHIN_LAST_SIX_MONTHS: 2
ADLS_ACUITY_SCORE: 20
DIFFICULTY_EATING/SWALLOWING: NO

## 2023-03-17 NOTE — ED NOTES
"[facility]  ED Nurse Handoff Report    ED Chief complaint: Altered Mental Status and Drug Overdose      ED Diagnosis:   Final diagnoses:   Hyperglycemia   Opiate abuse, continuous (H)       Code Status: see admitting MD    Allergies: No Known Allergies    Patient Story: [place], became combative with EMS. Pt A&O to self only. Says he ate \"30s of Fentanyl\". EMS gave 200 IM Ketamine. BS read as \"High\". 's/100s, 83% on RA, placed on 4L nasal cannula. Pt is slightly lethargic and arrived on hold with restraints.   Focused Assessment:  pt's BGL was 708. DKA protocol was started and was discontinued when BGL reached 216. Pt's BGL is now stabilized and pt is alert and aware of his surrounding and situation.    Treatments and/or interventions provided: see MAR for medications, labs  Patient's response to treatments and/or interventions: great, no longer in DKA    To be done/followed up on inpatient unit:  see orders    Does this patient have any cognitive concerns?:  none    Activity level - Baseline/Home:  Independent  Activity Level - Current:   Independent    Patient's Preferred language: English   Needed?: No    Isolation: None  Infection: Not Applicable  na  Patient tested for COVID 19 prior to admission: YES  Bariatric?: No    Vital Signs:   Vitals:    03/16/23 2340 03/16/23 2343 03/17/23 0140 03/17/23 0230   BP: (!) 142/85  116/71 111/67   Pulse: 109 110 90 82   Resp: 13 28 13 13   Temp:       TempSrc:       SpO2: 97% 98% 97% 98%   Weight:           Cardiac Rhythm:     Was the PSS-3 completed:   Yes  What interventions are required if any?               Family Comments: none present  OBS brochure/video discussed/provided to patient/family: N/A              Name of person given brochure if not patient: na              Relationship to patient: na    For the majority of the shift this patient's behavior was Green.   Behavioral interventions " performed were none.    ED NURSE PHONE NUMBER: [phone]

## 2023-03-17 NOTE — CONSULTS
Met with pt for CD Consult and completed MUSA Assessment.  Pt is recommended for the residential LOC.  Sending ROIs to unit  for pt signature to facilitate referrals.    Recommendations:  1)  Complete a Residential CD Treatment Program.  2)  Abstain from all mood-altering chemicals unless prescribed by a licensed provider.   3)  Attend, at minimum, 2 weekly support group meetings, such as 12 step based (AA/NA), SMART Recovery, Health Realizations, and/or Refuge Recovery meetings.     4)  Actively work with a male mentor/sponsor on a weekly basis.   5)  Follow all the recommendations of your treatment/medical providers.  6)  Patient may benefit from obtaining a full mental health evaluation.      Clinical Substantiation: Patient is a 22 year old Black man currently hospitalized for altered mental status and agitation.  Patient reported having ingested an unknown amount of fentanyl pills to EMS.  Patient reports a history of 1 previous CD treatment.  Patient denies current withdrawal symptoms.  During assessment patient was a a guarded and inconsistent historian.  Patient appears to lack insight into his own chemical, mental, and physical health.  Patient is currently experiencing homelessness and lacks a sober living environment, lacks long-term sober maintenance skills, lacks sober coping skills, lacks awareness regarding the disease model of addiction, lacks a sober peer support network, has medical issues which are exacerbated by substance abuse and has mental health symptoms which are exacerbated by substance abuse. Patient is recommended for the residential level of care.     Referrals/ Alternatives:  [facility]  [address] [place] [address]  P: [phone]  F: [phone]     [facility]  [address] [place] [address]  P: [phone]   F:  [phone]      [facility] [address] [place] [address]  P: [phone]  F: [phone]     [facility]  [place] [address] [place] [address]  P: [phone]  F: [phone]     DAANES Assessment ID: [id number]     MUSA consult completed by:   MI Dolan, JOSE  E-mail Address: [email or web address] Consult & Liaison Department  [facility] [place], [address] [place] [address]     *Due to regulation of Title 42 of the Code of Federal Regulations (CFR) Part 2: Confidentiality laws apply to this note and the information wherein.  Thus, this note cannot be copy and pasted into any other health care staff's note nor can it be included in general medical records sent to ANY outside agency without the patient's written consent.
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
## 2023-03-17 NOTE — PROGRESS NOTES
RECEIVING UNIT ED HANDOFF REVIEW    ED Nurse Handoff Report was reviewed by: Nupur Chou RN on March 17, 2023 at 4:39 AM

## 2023-03-17 NOTE — PLAN OF CARE
Goal Outcome Evaluation:         Summary:  AMS and Opiate abuse/ overdose   DATE & TIME: 3/17/23 8830-7906  Cognitive Concerns/ Orientation: A&Ox4  BEHAVIOR & AGGRESSION TOOL COLOR: Green  ABNL VS/O2: VSS on RA   MOBILITY: SBA  PAIN MANAGMENT: Denied pain  DIET: Mod Carb- carb counting   BOWEL/BLADDER: Continent - no BM this shift   ABNL LAB/BG: K+ 3.8, Na+ 139, Creat:0.60,  Lactic acid 3.8- MD notified (NS bolus started)  B, 107  DRAIN/DEVICES: R & L Forearm IV's. R IV infusing NS bolus   TELEMETRY RHYTHM: NSR  SKIN: Scattered bruises and scabs throughout body, pt. States increased scar tissue in abdomen area   TESTS/PROCEDURES: none this shift   D/C DAY/GOALS/PLACE: pending  OTHER IMPORTANT INFO:   Pt. Came to the floor early this AM. Pt. Came to the hospital via EMS. Was found at the airport upset and combative. Pt. States he is homeless and doesn't have a family to go home too (in foster care). Took fentanyl but doesn't recall how much. Pt. Meds were taken down to pharmacy.     Pt. Lactic acid 3.8- MD notified started bolus, discontinued LR, after bolus start NS @125 mL/hr

## 2023-03-17 NOTE — ED PROVIDER NOTES
"  History     Chief Complaint:  Altered Mental Status and Drug Overdose       The history is provided by the patient and the EMS personnel. History limited by: mental status change.      Lauro Maddox is a 22 year old male with a history of type 1 diabetes mellitus and opioid use disorder who presents via EMS with an altered mental status and possible drug overdose. The patient states that his legs hurt. He states that he takes insulin regularly, and last took it today, but he does not measure his blood sugars. He reports taking fentanyl tonight, but he denies other drug use and denies alcohol use. He denies nausea, diarrhea, dysuria, and difficulty urinating. He denies having any medical problems other than diabetes.     Independent Historian:   EMS - They report that the patient was [place] combative and upset at the airport. They report attempting to give him 250 mg of IM ketamine in his thigh, however the patient moved during the injection, and they estimate only 125 mg went in. They state that his blood sugar read high, and they gave him 400 mL of fluid. They state that the patient was able to say who he is but not where he is. They state that the patient informed them he took \"30s of fentanyl\" but did not say how many. They report that he was initially hypertensive in the 160s/100s followed by the 140s systolic. They states that he was tachycardic initially in the 140s but this later lowered to the 120s. Lastly, they report putting him on oxygen as he was hypoxic at 83%.     Review of External Notes: [place] [facility] [place] showing DKA.      ROS:  Review of Systems   Unable to perform ROS: Mental status change   Gastrointestinal: Negative for diarrhea and nausea.   Genitourinary: Negative for difficulty urinating and dysuria.   Musculoskeletal:        Leg pain   Psychiatric/Behavioral: Positive for confusion.       Allergies:  No Known Allergies     Medications:    Lantus  Novolog  Glucagon    Past " Medical History:    Type 1 diabetes mellitus  STEPAN (acute kidney injury)  Hyponatremia  DKA (diabetic ketoacidoses)  Moderate opioid use disorder  Hoarding behavior    Past Surgical History:    ORIF thumb fracture    Family History:    Brother: congenital heart disease    Social History:  Presents to the ED unaccompanied  Presents via EMS  Drug use: positive  Alcohol use: negative    Physical Exam     Patient Vitals for the past 24 hrs:   BP Temp Temp src Pulse Resp SpO2 Weight   03/17/23 0230 111/67 -- -- 82 13 98 % --   03/17/23 0140 116/71 -- -- 90 13 97 % --   03/16/23 2343 -- -- -- 110 28 98 % --   03/16/23 2340 (!) 142/85 -- -- 109 13 97 % --   03/16/23 2333 (!) 124/96 -- -- 117 17 99 % --   03/16/23 2330 137/82 -- -- 112 15 98 % --   03/16/23 2322 -- 97.5  F (36.4  C) Axillary (!) 128 15 95 % 70 kg (154 lb 5.2 oz)   03/16/23 2320 (!) 152/103 -- -- (!) 126 18 -- --        Physical Exam  Head: No signs of trauma.   Mouth/Throat: Oropharynx is clear and moist.   Eyes: Conjunctivae are normal. Pupils are equal, round, and reactive to light.   Neck: Normal range of motion. No nuchal rigidity. No cervical adenopathy  CV: Tachycardic and regular rhythm.    Resp: Effort normal and breath sounds normal. No respiratory distress.   GI: Soft. There is no tenderness.  No rebound or guarding.  Normal bowel sounds.    MSK: Normal range of motion. no edema. No Calf tenderness.  Neuro: The patient is alert and oriented to person, place, and time.  PERRLA, EOMI, strength in upper/lower extremities normal and symmetrical. Sensation normal. Speech normal.  Skin: Skin is warm and dry. No rash noted.   Psych: normal mood and affect. behavior is normal.       Emergency Department Course   ECG  ECG results from 03/16/23   EKG 12-lead, tracing only     Value    Systolic Blood Pressure     Diastolic Blood Pressure     Ventricular Rate 118    Atrial Rate 118    ID Interval 166    QRS Duration 84        QTc 454    P Axis 65    R  AXIS 106    T Axis 61    Interpretation ECG      Sinus tachycardia  Rightward axis  Borderline ECG  When compared with ECG of 29-MAR-2022 11:37,  QRS axis Shifted right       Imaging:  XR Chest Port 1 View   Final Result   IMPRESSION: No focal airspace consolidation. No pleural effusion or pneumothorax.      Cardiomediastinal silhouette is normal.         Report per radiology    Laboratory:  Labs Ordered and Resulted from Time of ED Arrival to Time of ED Departure   COMPREHENSIVE METABOLIC PANEL - Abnormal       Result Value    Sodium 129 (*)     Potassium 4.5      Chloride 92 (*)     Carbon Dioxide (CO2) 26      Anion Gap 11      Urea Nitrogen 31.6 (*)     Creatinine 0.85      Calcium 8.6      Glucose 780 (*)     Alkaline Phosphatase 101      AST 56 (*)     ALT 39      Protein Total 6.4      Albumin 3.7      Bilirubin Total 0.3      GFR Estimate >90     KETONE BETA-HYDROXYBUTYRATE QUANTITATIVE, RAPID - Abnormal    Ketone (Beta-Hydroxybutyrate) Quantitative 1.00 (*)    ROUTINE UA WITH MICROSCOPIC REFLEX TO CULTURE - Abnormal    Color Urine Straw      Appearance Urine Clear      Glucose Urine >=1000 (*)     Bilirubin Urine Negative      Ketones Urine Negative      Specific Gravity Urine 1.032      Blood Urine Negative      pH Urine 5.0      Protein Albumin Urine Negative      Urobilinogen Urine Normal      Nitrite Urine Negative      Leukocyte Esterase Urine Negative      RBC Urine 0      WBC Urine <1      Squamous Epithelials Urine <1     TROPONIN T, HIGH SENSITIVITY - Abnormal    Troponin T, High Sensitivity 24 (*)    CBC WITH PLATELETS AND DIFFERENTIAL - Abnormal    WBC Count 6.8      RBC Count 4.28 (*)     Hemoglobin 12.3 (*)     Hematocrit 38.3 (*)     MCV 90      MCH 28.7      MCHC 32.1      RDW 14.2      Platelet Count 338      % Neutrophils 55      % Lymphocytes 37      % Monocytes 7      % Eosinophils 0      % Basophils 1      % Immature Granulocytes 0      NRBCs per 100 WBC 0      Absolute Neutrophils 3.7       Absolute Lymphocytes 2.6      Absolute Monocytes 0.5      Absolute Eosinophils 0.0      Absolute Basophils 0.1      Absolute Immature Granulocytes 0.0      Absolute NRBCs 0.0     ISTAT GASES LACTATE VENOUS POCT - Abnormal    Lactic Acid POCT 2.0      Bicarbonate Venous POCT 26      O2 Sat, Venous POCT 92 (*)     pCO2V Venous POCT 45      pH Venous POCT 7.37      pO2 Venous POCT 65 (*)    GLUCOSE BY METER - Abnormal    GLUCOSE BY METER POCT >600 (*)    ISTAT GASES LACTATE VENOUS POCT - Abnormal    Lactic Acid POCT 3.4 (*)     Bicarbonate Venous POCT 26      O2 Sat, Venous POCT 86 (*)     pCO2V Venous POCT 49      pH Venous POCT 7.33      pO2 Venous POCT 56 (*)    HEMOGLOBIN A1C - Abnormal    Hemoglobin A1C 13.4 (*)    PHOSPHORUS - Abnormal    Phosphorus 4.8 (*)    DRUG ABUSE SCREEN 77 URINE (FL, RH, SH) - Abnormal    Amphetamines Urine Screen Positive (*)     Barbituates Urine Screen Negative      Benzodiazepine Urine Screen Negative      Cannabinoids Urine Screen Negative      Opiates Urine Screen Negative      PCP Urine Screen Negative      Cocaine Urine Screen Negative     GLUCOSE BY METER - Abnormal    GLUCOSE BY METER POCT 278 (*)    GLUCOSE BY METER - Abnormal    GLUCOSE BY METER POCT 216 (*)    GLUCOSE BY METER - Abnormal    GLUCOSE BY METER POCT 152 (*)    GLUCOSE BY METER - Abnormal    GLUCOSE BY METER POCT 156 (*)    GLUCOSE BY METER - Abnormal    GLUCOSE BY METER POCT 159 (*)    LIPASE - Normal    Lipase 25     ETHYL ALCOHOL LEVEL - Normal    Alcohol ethyl <0.01     MAGNESIUM - Normal    Magnesium 1.9     GLUCOSE MONITOR NURSING POCT   GLUCOSE MONITOR NURSING POCT   LACTIC ACID WHOLE BLOOD   POTASSIUM        Procedures   None    Emergency Department Course & Assessments:       Interventions:  Medications   lactated ringers BOLUS 1,000 mL (0 mLs Intravenous Stopped 3/16/23 4990)     Followed by   lactated ringers BOLUS 1,000 mL (1,000 mLs Intravenous $New Bag 3/16/23 0872)     Followed by   lactated  ringers infusion ( Intravenous $New Bag 3/17/23 0148)   lido-EPINEPHrine-tetracaine (LET) topical gel GEL (has no administration in time range)   dextrose 50 % injection 25-50 mL (has no administration in time range)   dextrose 5% and 0.45% NaCl infusion (has no administration in time range)   insulin 1 unit/1 mL in NS (NovoLIN, HumuLIN Regular) drip -ED DKA algorithm (0 Units/hr Intravenous Stopped 3/17/23 0240)   insulin glargine (LANTUS PEN) injection 28 Units (28 Units Subcutaneous $Given 3/17/23 0114)        Assessments:  2316 I obtained history and examined the patient as noted above.     2318  In person face to face assessment completed, including an evaluation of the patient's immediate reaction to the intervention, complete review of systems assessment, behavioral assessment and review/assessment of history, drugs and medications, recent labs, etc., and behavioral condition.  The patient experienced: No adverse physical outcome from seclusion/restraint initiation.  The intervention of restraint or seclusion needs to continue.    0042 Patient rechecked and updated.     Independent Interpretation (X-rays, CTs, rhythm strip):  I interpreted the chest X-ray: no pneumothorax.     Consultations/Discussion of Management or Tests:  0047 I spoke with Dr. العراقي of the hospitalist services who is in agreement to accept the patient for admission.     Social Determinants of Health affecting care:   Homelessness/Housing Insecurity    Disposition:  The patient was admitted to the hospital under the care of Dr. العراقي.     Impression & Plan      Medical Decision Making:  Lauro Maddox is a 22-year-old gentleman who presents with altered mental status.  History is somewhat limited from the patient initially on arrival.  He apparently was combative at the airport and ultimately was given ketamine IM to help facilitate care with EMS.  They found his blood sugar to be high.  On arrival to the ER he was more calm.  His  glucose continued to be high on the bedside glucometer and was nearly 800 from the lab.  On chart review the patient does have recurrent history of DKA.  Blood work showed a slight elevation of ketones although not significantly so.  He did not have a significant drop in his pH and his anion gap was normal.  Given the significant degree of hyperglycemia, he was given IV fluids and started on insulin drip.  I also gave him his evening dose of Lantus in order to keep him on schedule with his Lantus as he reports he has not taken it.  He did not require any narcan.      Diagnosis:    ICD-10-CM    1. Hyperglycemia  R73.9       2. Opiate abuse, continuous (H)  F11.10            Scribe Disclosure:  I, Marily Gregory, am serving as a scribe at 11:21 PM on 3/16/2023 to document services personally performed by Saravanan Pérez MD based on my observations and the provider's statements to me.   3/16/2023   Saravanan Pérez MD Bergenstal, John A, MD  03/17/23 0552

## 2023-03-17 NOTE — H&P
"[facility]    History and Physical - Hospitalist Service       Date of Admission:  3/16/2023    Assessment & Plan      Lauro Maddox is a 22 year old male admitted on 3/16/2023. He presents after an ingestion     DKA, mild  Hyperglycemia  DM I, poorly controlled  [needs rec- humalog 2 units with meals and at HS, carb counting, levemir 28 units daily]  Hgb A1C 14.2 1/2023. Has followed with endo in the past but doesn't appear to have seen in awhile.  Hospitalized 1/1, 1/20, 2/25, 3/8 for DKA. [place] confused. Improving at time of my visit, denies c/o. In ED tachy 110-120s, BPs stable. BS ~800. Ketones 1.0. lactic 3.4. other labs generally unremarkable. CXR clear.   - lantus 28 units at bedtime  - insulin gtt until BS<250 then stop  - med sliding scale insulin once off insulin gtt  - IV fluids  - q1 hour BS checks while on insulin gtt, then TID and HS  - repeat ketones, BMP in the am  - repeat lactic, K at ~3:30  - mod CHO diet  - check phos, mg    Encephalopathy, toxic  Fentanyl use disorder  With fentanyl ingestion, found alert and confused at airport, combative with EMS and given 125 mg IM ketamine. Told EMS took \"30s of fentanyl\", unclear amount. Appears to be clearing.   - UDS  - chem dep- pt in agreement  - monitor for opiate w/d     Diet:   mod CHO  DVT Prophylaxis: Ambulate every shift  Wilson Catheter: Not present  Lines: None     Cardiac Monitoring: None  Code Status:   Full    Clinically Significant Risk Factors Present on Admission         # Hyponatremia: Lowest Na = 129 mmol/L in last 2 days, will monitor as appropriate                       Disposition Plan           Brad العراقي MD  Hospitalist Service  [facility]  Securely message with [employer] (more info)  Text page via [employer] Paging/Directory     ______________________________________________________________________    Chief Complaint   Altered mental status    History is obtained from " the patient, electronic health record and emergency department physician    History of Present Illness   Lauro Maddox is a 22 year old male who presents with altered mental status.  He has a history of diabetes mellitus type 1 and has had multiple hospitalizations for DKA this year.  He also has a history of fentanyl use disorder.  Patient was found altered and agitated at the airport today.  Patient states he remembers everything up until EMS got to the airport and then he does not remember.  Per report he was agitated.  Patient states he took fentanyl tablets today but unclear how many.  He currently is coming down from fentanyl but not withdrawing yet.  He is generally alert and oriented and pleasant.  He currently denies any chest pain or shortness of breath.  He states he has not been very good with taking his insulin lately.  He also has housing insecurity and is currently homeless.  He states he was raised in the foster system and does not really have a family to go back to.      Past Medical History    Past Medical History:   Diagnosis Date     Diabetes (H)        Past Surgical History   Past Surgical History:   Procedure Laterality Date     NO HISTORY OF SURGERY         Prior to Admission Medications   Prior to Admission Medications   Prescriptions Last Dose Informant Patient Reported? Taking?   acetone, Urine, test STRP   No No   Sig: Test ketones when sick or when have 2 blood sugars in a row >300   blood glucose (ACCU-CHEK FASTCLIX) lancing device   No No   Sig: Device to be used with lancets.   blood glucose monitoring (ACCU-CHEK JULIO C PLUS) meter device kit   No No   Sig: Use to test blood sugars 6-8 times daily or as directed.   blood glucose monitoring (ACCU-CHEK JULIO C PLUS) test strip   No No   Sig: Use to test blood sugar 10 times daily or as directed. PA approved 17-20   blood glucose monitoring (ACCU-CHEK FASTCLIX) lancets   No No   Si each daily   glucagon 1 MG kit   No No   Sig:  1mg injection for severe hypoglycemica involving loss of consciousness or seizure.   insulin aspart (NOVOLOG PEN) 100 UNIT/ML pen   No No   Sig: Inject 8 Units Subcutaneous 3 times daily (with meals)   insulin glargine (LANTUS PEN) 100 UNIT/ML pen   No No   Sig: Inject 34 Units Subcutaneous every morning   insulin pen needle (BD ALIN U/F) 32G X 4 MM   No No   Sig: Use 8 daily or as directed.      Facility-Administered Medications: None        Review of Systems    The 10 point Review of Systems is negative other than noted in the HPI or here.      Physical Exam   Vital Signs: Temp: 97.5  F (36.4  C) Temp src: Axillary BP: (!) 142/85 Pulse: 110   Resp: 28 SpO2: 98 % O2 Device: None (Room air) Oxygen Delivery: 2 LPM  Weight: 154 lbs 5.15 oz    General Appearance: Pleasant, mental status clearing  Respiratory: CTA B  Cardiovascular: RRR, no edema  GI: soft, nt/nd  Skin: no lesions grossly   Other: CN grossly intact, TOM     Medical Decision Making       MANAGEMENT DISCUSSED with the following over the past 24 hours: ED provider   NOTE(S)/MEDICAL RECORDS REVIEWED over the past 24 hours: outside admission records from 1/2023-3/2023  Tests ORDERED & REVIEWED in the past 24 hours:  - CMP  - CBC  - Lactic Acid  - Lipase  - Magnesium  - Phosphorus  - Troponin  Tests personally interpreted in the past 24 hours:  - CHEST XRAY showing clear lungs  Medical complexity over the past 24 hours:  - Intensive monitoring for MEDICATION TOXICITY      Data     I have personally reviewed the following data over the past 24 hrs:    6.8  \   12.3 (L)   / 338     129 (L) 92 (L) 31.6 (H) /  780 (HH)   4.5 26 0.85 \       ALT: 39 AST: 56 (H) AP: 101 TBILI: 0.3   ALB: 3.7 TOT PROTEIN: 6.4 LIPASE: 25       Trop: 24 (H) BNP: N/A       Procal: N/A CRP: N/A Lactic Acid: 3.4 (H)         Imaging results reviewed over the past 24 hrs:   Recent Results (from the past 24 hour(s))   XR Chest Port 1 View    Narrative    EXAM: XR CHEST PORT 1  VIEW  LOCATION: [facility]  DATE/TIME: 3/16/2023 11:44 PM    INDICATION: Altered mental status.  COMPARISON: 07/22/2020.      Impression    IMPRESSION: No focal airspace consolidation. No pleural effusion or pneumothorax.    Cardiomediastinal silhouette is normal.

## 2023-03-17 NOTE — PROVIDER NOTIFICATION
MD Notification    Notified Person: MD    Notified Person Name: Dr. Brad العراقي    Notification Date/Time: 3/17/23 @0603    Notification Interaction: Bandwidth messaging     Purpose of Notification:    Pts. lactic acid 3.8     Orders Received: pending     Comments:

## 2023-03-17 NOTE — PROGRESS NOTES
[facility]    Medicine Progress Note - Hospitalist Service    Date of Admission:  3/16/2023    Assessment & Plan      Lauro Maddox is a 22 year old male admitted on 3/16/2023. He presents after an ingestion     DKA, mild, resolved  Hyperglycemia, resolving  DM I, poorly controlled  [humalog 2 units with meals and at HS, carb counting, levemir 28 units daily]  Hgb A1C 14.2 1/2023. Has followed with endo in the past but doesn't appear to have seen in awhile.  Hospitalized 1/1, 1/20, 2/25, 3/8 for DKA. [place]. Improving at time of my visit, denies c/o. In ED tachy 110-120s, BPs stable. BS ~800. Ketones 1.0. lactic 3.4. other labs generally unremarkable. CXR clear.   - lantus 28 units at bedtime  - insulin gtt until BS<250 then stop  - med sliding scale insulin once off insulin gtt  - IV fluids  - q1 hour BS checks while on insulin gtt, then TID and HS  - repeat ketones, BMP in the am  - repeat lactic, K at ~3:30  - mod CHO diet  - check phos, mg    Lactic acidosis  * persistent lactic acidosis. No clear cause. No obvious infection.  * Denies injection drug use. Does have amateur tattoos.   - monitor  - check blood cultures.     Encephalopathy, toxic  Fentanyl use disorder  With fentanyl ingestion, found alert and confused at airport, combative with EMS and given 125 mg IM ketamine. Reports he uses fentanyl every other day. Typically snorts powder or takes pills. Denies injection use.  - UDS  - chem dep- pt in agreement  - monitor for opiate w/d       Diet: Moderate Consistent Carb (60 g CHO per Meal) Diet    DVT Prophylaxis: Pneumatic Compression Devices  Wilson Catheter: Not present  Lines: None     Cardiac Monitoring: None  Code Status: Full Code      Clinically Significant Risk Factors Present on Admission         # Hyponatremia: Lowest Na = 129 mmol/L in last 2 days, will monitor as appropriate  # Hypocalcemia: Lowest Ca = 8.4 mg/dL in last 2 days, will monitor and  replace as appropriate                      Disposition Plan      Expected Discharge Date: 03/21/2023                  Jude Rollins MD  Hospitalist Service  [facility]  Securely message with [employer] (more info)  Text page via [employer] Paging/Directory   ______________________________________________________________________    Interval History   Feels quite fatigued. No obvious localizing pain other than chronic right leg pain.  Eating and drinking.     Physical Exam   Vital Signs: Temp: 97.6  F (36.4  C) Temp src: Oral BP: 120/73 Pulse: 75   Resp: 17 SpO2: 99 % O2 Device: None (Room air) Oxygen Delivery: 2 LPM  Weight: 154 lbs 4.8 oz    Constitutional: Awake, alert, cooperative, no apparent distress  Respiratory: Clear to auscultation bilaterally, no crackles or wheezing  Cardiovascular: Regular rate and rhythm, normal S1 and S2, and no murmur noted  GI: Normal bowel sounds, soft, non-distended, non-tender  Skin/Integumen: No rashes, no cyanosis, no edema  Other:       Medical Decision Making       50 MINUTES SPENT BY ME on the date of service doing chart review, history, exam, documentation & further activities per the note.      Data   ------------------------- PAST 24 HR DATA REVIEWED -----------------------------------------------    I have personally reviewed the following data over the past 24 hrs:    6.8  \   12.3 (L)   / 338     139 100 21.2 (H) /  107 (H)   3.8; 3.8 28 0.60 (L) \       ALT: 39 AST: 56 (H) AP: 101 TBILI: 0.3   ALB: 3.7 TOT PROTEIN: 6.4 LIPASE: 25       Trop: 24 (H) BNP: N/A       TSH: N/A T4: N/A A1C: 13.4 (H)       Procal: N/A CRP: N/A Lactic Acid: 3.7 (H)         Imaging results reviewed over the past 24 hrs:   Recent Results (from the past 24 hour(s))   XR Chest Port 1 View    Narrative    EXAM: XR CHEST PORT 1 VIEW  LOCATION: [facility]  DATE/TIME: 3/16/2023 11:44 PM    INDICATION: Altered mental status.  COMPARISON: 07/22/2020.       Impression    IMPRESSION: No focal airspace consolidation. No pleural effusion or pneumothorax.    Cardiomediastinal silhouette is normal.

## 2023-03-17 NOTE — PROGRESS NOTES
"Hospitalist jose messaged with this message\"FYI - bgm 417 for lunch, 6 unit insulin given per SS. thank you gerhard RN \". Pending call back. Will continue to monitor patient. Gerhard Barnes RN on 3/17/2023 at 2:52 PM    1811 Patient is AO X 4, independent, denied pain, IVF,   No any distress noted. Patient is resting comfortably in bed. Gerhard Barnes RN on 3/17/2023 at 6:12 PM      "

## 2023-03-17 NOTE — PROGRESS NOTES
"CLINICAL NUTRITION SERVICES  -  ASSESSMENT NOTE      Recommendations Ordered by Registered Dietitian (RD):   - ordered 2 pm snack of strawberry greek yogurt and mixed fruit  - ordered 8 pm snack of cheese and crackers   - RD introduced self to pt, role in care. Discussed mod CHO diet. Importance of protein, fats, fibers with meals for blood sugar stabilization. Encouraged TID meals + snacks. Encouraged protein intake for preservation of muscle mass.    Malnutrition:   % Weight Loss:  Difficult to assess  % Intake:  Decreased intake does not meet criteria for malnutrition   Subcutaneous Fat Loss:  None observed  Muscle Loss:  None observed   Fluid Retention:  None noted    Malnutrition Diagnosis: Patient does not meet two of the established criteria necessary for diagnosing malnutrition       REASON FOR ASSESSMENT  Lauro Maddox is a 22 year old male seen by Registered Dietitian for Admission Nutrition Risk Screen for positive (unsure wt loss, no decreased appetite)      NUTRITION HISTORY  - Information obtained from chart review and discussion with pt   - [facility] of T1DM and opioid use disorder   - BIBA for altered mental status and possible drug overdose. EMS report that the [place]. Patient informed EMS he took \"30s of fentanyl\" but did not say how many.   - admitted with mild DKA and Toxic Encephalopathy  - social hx: currently homeless, occasionally living in shelters/haning out in transit stations. Raised in foster system, does not have good support system or family to \"go back to.\"  - per care everywhere, pt has been admitted 4x this past year for DKA   - pt reported he has been \"trying to eat a lot\" recently but continues to lose wt. He reported this has been ongoing since he was 18 years old. Typically has good appetite. He likes to eat meats, \"junk food,\" grains. Doesn't follow any particular diet but familiar with consistent carb diet. Also familiar with how fats, " "proteins, fibers affect blood glucose. UBW about 180#. Unsure how much wt he has lost.     CURRENT NUTRITION ORDERS  Diet Order: Moderate Consistent Carbohydrate    Current Intake/Tolerance:  - visited with pt this morning. He was eating his breakfast. Happy with the options available. Encouraged pt to order TID meals. Encouraged good protein intake for preservation of muscle mass. Discussed adding ONS or snacks to provide additional kcal and protein for wt gain and blood sugar stabilization.  Pt would like cheese and crackers- will send for HS snack for blood sugar stabilization overnight. Pt would also like strawberry greek yogurt with mixed fruit. Pt declined having questions regarding mod CHO diet.   - per nursing flow sheet, no intakes documented this admit   - per health touch, pt received breakfast this AM    PER CHART REVIEW  RN note-- skin: Scattered bruises and scabs throughout body, pt. States increased scar tissue in abdomen area    NUTRITION FOCUSED PHYSICAL ASSESSMENT FOR DIAGNOSING MALNUTRITION)  Yes         Observed:    No nutrition-related physical findings observed   Appears thin       ANTHROPOMETRICS  Height: 6' 2\"  Weight: 154 lbs 4.8 oz  Body mass index is 19.81 kg/m .  Weight Status:  Normal BMI -- borderline to underwt status   IBW: 86.4 kg  % IBW: 81%  Weight History: difficult to assess wt hx, variable wts record over past few months   Wt Readings from Last 3 Encounters:   03/17/23 70 kg (154 lb 4.8 oz)   03/29/22 65.1 kg (143 lb 8 oz) (33 %, Z= -0.44)*   10/27/17 66.5 kg (146 lb 9.7 oz) (82 %, Z= 0.91)*     * Growth percentiles are based on CDC (Boys, 2-20 Years) data.     Care everywhere--  03/08/23 : 73.6 kg (162 lb 4.1 oz)  02/25/23 : 66.5 kg (146 lb 9.6 oz)   01/20/23 : 69.2 kg (152 lb 8.9 oz)  01/01/23 : 68.4 kg (150 lb 12.7 oz)    LABS  Labs reviewed:   Lab Results   Component Value Date     03/17/2023    POTASSIUM 3.8 03/17/2023    POTASSIUM 3.8 03/17/2023    BUN 21.2 (H) " 03/17/2023    CR 0.60 (L) 03/17/2023    MAG 1.9 03/16/2023    PHOS 4.8 (H) 03/16/2023    ALKPHOS 101 03/16/2023    ALT 39 03/16/2023    AST 56 (H) 03/16/2023     Lab Results   Component Value Date     (H) 03/17/2023     (H) 03/17/2023     (H) 03/17/2023     (H) 03/17/2023     (H) 03/17/2023   MD note-- DM 1 is poorly controlled with Hgb A1C 14.2 1/2023.     MEDICATIONS  Medications reviewed: insulin aspart (MSSI), insulin detemir (28 units HS), IVF @ 125 mL/hr  Insulin gtt stopped early this AM      ASSESSED NUTRITION NEEDS PER APPROVED PRACTICE GUIDELINES:  Dosing Weight: 70 kg (3/17)  Estimated Energy Needs: 5847-9293+ kcals (25-30+ Kcal/Kg)  Justification: maintenance  Estimated Protein Needs: 70-84 grams protein (1-1.2 g pro/Kg)  Justification: preservation of lean body mass  Estimated Fluid Needs: 1 mL/Kcal  Justification: maintenance OR per provider pending fluid status    MALNUTRITION:  % Weight Loss:  Difficult to assess  % Intake:  Decreased intake does not meet criteria for malnutrition   Subcutaneous Fat Loss:  None observed  Muscle Loss:  None observed   Fluid Retention:  None noted    Malnutrition Diagnosis: Patient does not meet two of the established criteria necessary for diagnosing malnutrition      NUTRITION DIAGNOSIS:  Altered nutrition-related lab value related to hyperglycemia 2/2 poorly controlled T1DM as evidenced by DKA admit, need for consistent CHO diet and snacks       NUTRITION INTERVENTIONS  Recommendations / Nutrition Prescription  - ordered 2 pm snack of strawberry greek yogurt and mixed fruit  - ordered 8 pm snack of cheese and crackers   - RD introduced self to pt, role in care. Discussed mod CHO diet. Importance of protein, fats, fibers with meals for blood sugar stabilization. Encouraged TID meals + snacks. Encouraged protein intake for preservation of muscle mass.       Implementation  Nutrition education   Composition of Meals and  Snacks    Nutrition Goals  Patient to consume % of nutritionally adequate meals TID with snacks over the next 5-7 days.      MONITORING AND EVALUATION:  Progress towards goals will be monitored and evaluated per protocol and Practice Guidelines      Erilnda Valdivia RD, LD

## 2023-03-17 NOTE — PHARMACY-ADMISSION MEDICATION HISTORY
Pharmacy Medication History  Admission medication history interview status for the 3/16/2023  admission is complete. See EPIC admission navigator for prior to admission medications     Location of Interview: Patient room  Medication history sources: Patient and Surescripts    Significant changes made to the medication list:  Removed Novolog and Lantus as patient reported using what he was [facility] [place] [facility] [place].  Confirmed using Humalog and Levemir.    In the past week, patient estimated taking medication this percent of the time: greater than 90%    Medication Affordability:  Not including over the counter (OTC) medications, was there a time in the past 12 months when you did not take your medications as prescribed because of cost?: No    Additional medication history information:   --  Compliancy and medication affordability per pt's report.    Medication reconciliation completed by provider prior to medication history? No    Time spent in this activity: 10 minutes    Prior to Admission medications    Medication Sig Last Dose Taking? Auth Provider Long Term End Date   glucagon 1 MG kit 1mg injection for severe hypoglycemica involving loss of consciousness or seizure. prn Yes Ave Leon MD Yes    insulin detemir (LEVEMIR PEN) 100 UNIT/ML pen Inject 28 Units Subcutaneous At Bedtime 3/15/2023 at hs Yes Unknown, Entered By History     insulin lispro (HUMALOG KWIKPEN) 100 UNIT/ML (1 unit dial) KWIKPEN Inject Subcutaneous 3 times daily (with meals) 1 unit per 8g of carbs. 3/16/2023 Yes Unknown, Entered By History Yes    naloxone (NARCAN) 4 MG/0.1ML nasal spray Spray 4 mg into one nostril alternating nostrils once as needed for opioid reversal every 2-3 minutes until assistance arrives prn Yes Unknown, Entered By History     acetone, Urine, test STRP Test ketones when sick or when have 2 blood sugars in a row >300   Sharlene Maciel, DESIREE CNP     blood glucose (ACCU-CHEK FASTCLIX)  lancing device Device to be used with lancets.   Sharlene Maciel APRN CNP     blood glucose monitoring (ACCU-CHEK JULIO C PLUS) meter device kit Use to test blood sugars 6-8 times daily or as directed.   Sharlene Maciel APRN CNP     blood glucose monitoring (ACCU-CHEK JULIO C PLUS) test strip Use to test blood sugar 10 times daily or as directed. PA approved 8/7/17-9/7/20   Sharlene Maciel APRN CNP     blood glucose monitoring (ACCU-CHEK FASTCLIX) lancets 6 each daily   Sharlene Maciel APRN CNP     insulin pen needle (BD ALIN U/F) 32G X 4 MM Use 8 daily or as directed.   Sharlene Maciel APRN CNP         The information provided in this note is only as accurate as the sources available at the time of update(s)

## 2023-03-17 NOTE — CONSULTS
Care Management Initial Consult    General Information  Assessment completed with: chart review        Primary Care Provider verified and updated as needed:     Readmission within the last 30 days:           Advance Care Planning:            Communication Assessment  Patient's communication style: spoken language (English or Bilingual)    Hearing Difficulty or Deaf: no   Wear Glasses or Blind: no    Cognitive  Cognitive/Neuro/Behavioral: WDL  Level of Consciousness: confused, somnolent     Orientation: disoriented to, place, time, situation  Mood/Behavior: hyperactive (agitated, impulsive), restless     Speech: garbled    Living Environment:   People in home:       Current living Arrangements:  Per staff, patient reports he is homeless      Able to return to prior arrangements:  He is requesting direct transfer to a treatment program.       Family/Social Support:  Care provided by:    Provides care for:                  Description of Support System:           Current Resources:   Patient receiving home care services:       Community Resources:    Equipment currently used at home: none  Supplies currently used at home:      Employment/Financial:  Employment Status:          Financial Concerns:             Lifestyle & Psychosocial Needs:  Social Determinants of Health     Tobacco Use: High Risk     Smoking Tobacco Use: Every Day     Smokeless Tobacco Use: Never     Passive Exposure: Not on file   Alcohol Use: Not on file   Financial Resource Strain: Not on file   Food Insecurity: Not on file   Transportation Needs: Not on file   Physical Activity: Not on file   Stress: Not on file   Social Connections: Not on file   Intimate Partner Violence: Not on file   Depression: Not on file   Housing Stability: Not on file       Functional Status:  Prior to admission patient needed assistance:              Mental Health Status:          Chemical Dependency Status:  Patient has completed a SUDS assessment.  He is requesting  direct transfer to a residential MUSA program.  A [facility] has completed a MUSA assessment and made referrals late today to 4 residential programs; [facility],   [facility], [facility] and [facility].  Patient has signed the 4 KATTY's and they are in patient's paper chart.   The intake department for these MUSA programs are not open on the w/e.  Monday is the earliest writer will be able to follow up with the referrals.    Prior to admission, patient needs to demonstrate independence with mobility and self cares.  He is currently on insulin. If he leaves on insulin, does he know how to administer and manage his diet within an environment that only provides regular diets.    Writer will meet with patient again to complete assessment.                         Values/Beliefs:  Spiritual, Cultural Beliefs, Alevism Practices, Values that affect care:                 Additional Information:    GERA AyalaSW

## 2023-03-17 NOTE — UTILIZATION REVIEW
Admission Status; Secondary Review Determination     Admission Date: 3/16/2023 11:17 PM       Under the authority of the Utilization Management Committee, the utilization review process indicated a secondary review on the above patient.  The review outcome is based on review of the medical records, discussions with staff, and applying clinical experience noted on the date of the review.        (x)      Inpatient Status Appropriate - This patient's medical care is consistent with medical management for inpatient care and reasonable inpatient medical practice.       RATIONALE FOR DETERMINATION      Brief clinical presentation, information copied from the chart, abbreviated and edited for relevant content:     Lauro Maddox is a 22 year old male admitted on 3/16/2023. He presented after an ingestion with metabolic acidosis and DKA. DM 1 is poorly controlled with Hgb A1C 14.2 1/2023.  [place] In ED tachy 110-120s, BPs stable. BS ~800. Ketones came back high at 3.8. Started on insulin drip on admission. On IVF. Close monitoring. Also admitted with toxic encephalopathy with history of fentanyl use and ingestion prior to admission. Chem dep consult, monitor for opioid withdrawal. Not able to safely discharge today.       At the time of admission with the information available to the attending physician, more than 2 nights hospital complex care was anticipated. Also, there was a risk of adverse outcome if patient was treated outpatient or observation. High intensity of services anticipated. Inpatient admission appropriate based on Medicare guidelines.       The information on this document is developed by the utilization review team in order for the business office to ensure compliance.  This only denotes the appropriateness of proper admission status and does not reflect the quality of care rendered.         The definitions of Inpatient Status and Observation Status used in making the determination above  are those provided in the CMS Coverage Manual, Chapter 1 and Chapter 6, section 70.4.      Sincerely,      Traci Lynn MD   Utilization Review/ Case Management  [facility].

## 2023-03-18 LAB
ANION GAP SERPL CALCULATED.3IONS-SCNC: 11 MMOL/L (ref 7–15)
BUN SERPL-MCNC: 14.3 MG/DL (ref 6–20)
CALCIUM SERPL-MCNC: 8.1 MG/DL (ref 8.6–10)
CHLORIDE SERPL-SCNC: 105 MMOL/L (ref 98–107)
CREAT SERPL-MCNC: 0.57 MG/DL (ref 0.67–1.17)
DEPRECATED HCO3 PLAS-SCNC: 22 MMOL/L (ref 22–29)
GFR SERPL CREATININE-BSD FRML MDRD: >90 ML/MIN/1.73M2
GLUCOSE BLDC GLUCOMTR-MCNC: 103 MG/DL (ref 70–99)
GLUCOSE BLDC GLUCOMTR-MCNC: 134 MG/DL (ref 70–99)
GLUCOSE BLDC GLUCOMTR-MCNC: 192 MG/DL (ref 70–99)
GLUCOSE BLDC GLUCOMTR-MCNC: 261 MG/DL (ref 70–99)
GLUCOSE BLDC GLUCOMTR-MCNC: 390 MG/DL (ref 70–99)
GLUCOSE BLDC GLUCOMTR-MCNC: 454 MG/DL (ref 70–99)
GLUCOSE SERPL-MCNC: 99 MG/DL (ref 70–99)
POTASSIUM SERPL-SCNC: 3.3 MMOL/L (ref 3.4–5.3)
POTASSIUM SERPL-SCNC: 3.3 MMOL/L (ref 3.4–5.3)
POTASSIUM SERPL-SCNC: 4.1 MMOL/L (ref 3.4–5.3)
SODIUM SERPL-SCNC: 138 MMOL/L (ref 136–145)

## 2023-03-18 PROCEDURE — 99207 PR APP CREDIT; MD BILLING SHARED VISIT: CPT | Performed by: NURSE PRACTITIONER

## 2023-03-18 PROCEDURE — 36415 COLL VENOUS BLD VENIPUNCTURE: CPT | Performed by: STUDENT IN AN ORGANIZED HEALTH CARE EDUCATION/TRAINING PROGRAM

## 2023-03-18 PROCEDURE — 99232 SBSQ HOSP IP/OBS MODERATE 35: CPT | Performed by: STUDENT IN AN ORGANIZED HEALTH CARE EDUCATION/TRAINING PROGRAM

## 2023-03-18 PROCEDURE — 80048 BASIC METABOLIC PNL TOTAL CA: CPT | Performed by: STUDENT IN AN ORGANIZED HEALTH CARE EDUCATION/TRAINING PROGRAM

## 2023-03-18 PROCEDURE — 258N000003 HC RX IP 258 OP 636: Performed by: INTERNAL MEDICINE

## 2023-03-18 PROCEDURE — 84132 ASSAY OF SERUM POTASSIUM: CPT | Performed by: STUDENT IN AN ORGANIZED HEALTH CARE EDUCATION/TRAINING PROGRAM

## 2023-03-18 PROCEDURE — 120N000001 HC R&B MED SURG/OB

## 2023-03-18 PROCEDURE — 87040 BLOOD CULTURE FOR BACTERIA: CPT | Performed by: STUDENT IN AN ORGANIZED HEALTH CARE EDUCATION/TRAINING PROGRAM

## 2023-03-18 PROCEDURE — 250N000013 HC RX MED GY IP 250 OP 250 PS 637: Performed by: STUDENT IN AN ORGANIZED HEALTH CARE EDUCATION/TRAINING PROGRAM

## 2023-03-18 RX ORDER — POTASSIUM CHLORIDE 1500 MG/1
40 TABLET, EXTENDED RELEASE ORAL ONCE
Status: COMPLETED | OUTPATIENT
Start: 2023-03-18 | End: 2023-03-18

## 2023-03-18 RX ORDER — POTASSIUM CHLORIDE 20MEQ/15ML
40 LIQUID (ML) ORAL ONCE
Status: COMPLETED | OUTPATIENT
Start: 2023-03-18 | End: 2023-03-18

## 2023-03-18 RX ADMIN — SODIUM CHLORIDE: 9 INJECTION, SOLUTION INTRAVENOUS at 10:45

## 2023-03-18 RX ADMIN — INSULIN DETEMIR 28 UNITS: 100 INJECTION, SOLUTION SUBCUTANEOUS at 20:50

## 2023-03-18 RX ADMIN — POTASSIUM CHLORIDE 40 MEQ: 20 SOLUTION ORAL at 10:42

## 2023-03-18 RX ADMIN — POTASSIUM CHLORIDE 40 MEQ: 1500 TABLET, EXTENDED RELEASE ORAL at 08:28

## 2023-03-18 RX ADMIN — INSULIN ASPART 3 UNITS: 100 INJECTION, SOLUTION INTRAVENOUS; SUBCUTANEOUS at 17:10

## 2023-03-18 ASSESSMENT — ACTIVITIES OF DAILY LIVING (ADL)
ADLS_ACUITY_SCORE: 21
ADLS_ACUITY_SCORE: 20
ADLS_ACUITY_SCORE: 21
ADLS_ACUITY_SCORE: 20

## 2023-03-18 NOTE — PLAN OF CARE
Goal Outcome Evaluation:    3/18/23  7a to 3 p  Summary:  AMS and Opiate abuse/ overdose     Orientation: A&O, calm and cooperative    Vitals/Tele: VSS on rA    IV Access/drains: L IV infusing, removed R IV    Diet: Mod carb, BG checks, Type 1 diabetic    Mobility: Independent    GI/: Continent    Wound/Skin: scattered scabs, pale    Consults: Hospitalist following    Discharge Plan: Chem dep treatment facility, SW following

## 2023-03-18 NOTE — PROVIDER NOTIFICATION
MD Notification    Notified Person: MD    Notified Person Name: Dr. العراقي    Notification Date/Time: 0303    Notification Interaction: volcera message    Purpose of Notification: Pt blood sugar 390 please advice    Orders Received: Insulin Aspart 5units    Comments:

## 2023-03-18 NOTE — PROGRESS NOTES
[facility]    Medicine Progress Note - Hospitalist Service    Date of Admission:  3/16/2023    Assessment & Plan      Lauro Maddox is a 22 year old male admitted on 3/16/2023. He presents after an ingestion.     DKA, mild, resolved  Hyperglycemia, resolving  DM I, poorly controlled  [humalog 2 units with meals and at HS, carb counting, levemir 28 units daily]  Hgb A1C 14.2 1/2023. Has followed with endo in the past but doesn't appear to have seen in awhile.  Hospitalized 1/1, 1/20, 2/25, 3/8 for DKA. [place] Improving at time of my visit, denies c/o. In ED tachy 110-120s, BPs stable. BS ~800. Ketones 1.0. lactic 3.4. other labs generally unremarkable. CXR clear.   - lantus 28 units at bedtime  - med sliding scale insulin  - aspart 1u per 15g CHO with meals and snacks.  - glucose TID and HS  - mod CHO diet    Lactic acidosis  * persistent lactic acidosis. No clear cause. No obvious infection.  * Denies injection drug use. Does have amateur tattoos.   - monitor  - check blood cultures.     Encephalopathy, toxic, resolving  Fentanyl use disorder  With fentanyl ingestion, found alert and confused at airport, combative with EMS and given 125 mg IM ketamine. Reports he uses fentanyl every other day. Typically snorts powder or takes pills. Denies injection use.  - UDS  - chem dep- pt in agreement  - monitor for opiate w/d       Diet: Moderate Consistent Carb (60 g CHO per Meal) Diet    DVT Prophylaxis: Pneumatic Compression Devices  Wilson Catheter: Not present  Lines: None     Cardiac Monitoring: None  Code Status: Full Code      Clinically Significant Risk Factors        # Hypokalemia: Lowest K = 3.3 mmol/L in last 2 days, will replace as needed  # Hyponatremia: Lowest Na = 129 mmol/L in last 2 days, will monitor as appropriate  # Hypocalcemia: Lowest Ca = 8.1 mg/dL in last 2 days, will monitor and replace as appropriate                        Disposition Plan      Expected  Discharge Date: 03/21/2023        Discharge Comments: FU will be done on referals by SW to residential MUSA's programs on Monday.          Jude Rollins MD  Hospitalist Service  [facility]  Securely message with [employer] (more info)  Text page via [email or web address] Paging/Directory   ______________________________________________________________________    Interval History   Feels quite fatigued. No obvious localizing pain other than chronic right leg pain.  Eating and drinking.     Physical Exam   Vital Signs: Temp: 97.5  F (36.4  C) Temp src: Oral BP: 120/84 Pulse: 68   Resp: 16 SpO2: 99 % O2 Device: None (Room air)    Weight: 154 lbs .92 oz    Constitutional: Awake, alert, cooperative, no apparent distress  Respiratory: Clear to auscultation bilaterally, no crackles or wheezing  Cardiovascular: Regular rate and rhythm, normal S1 and S2, and no murmur noted  GI: Normal bowel sounds, soft, non-distended, non-tender  Skin/Integumen: No rashes, no cyanosis, no edema  Other:       Medical Decision Making       40 MINUTES SPENT BY ME on the date of service doing chart review, history, exam, documentation & further activities per the note.      Data   ------------------------- PAST 24 HR DATA REVIEWED -----------------------------------------------    I have personally reviewed the following data over the past 24 hrs:    N/A  \   N/A   / N/A     138 105 14.3 /  134 (H)   3.3 (L); 3.3 (L) 22 0.57 (L) \       Procal: N/A CRP: N/A Lactic Acid: N/A         Imaging results reviewed over the past 24 hrs:   No results found for this or any previous visit (from the past 24 hour(s)).

## 2023-03-18 NOTE — PLAN OF CARE
Goal Outcome Evaluation:       Summary:  AMS and Opiate abuse/ overdose   DATE & TIME: 3/17/23-3/18/23 7623-8955  Cognitive Concerns/ Orientation: A&Ox4  BEHAVIOR & AGGRESSION TOOL COLOR: Green  ABNL VS/O2: VSS on RA   MOBILITY: IND  PAIN MANAGMENT: Denied pain  DIET: Mod Carb- carb counting   BOWEL/BLADDER: Continent - no BM this shift   ABNL LAB/BG: Lactic acid 3.7-   B/390/192 (MD paged Aspart 5units given once, BG rechecked and was 192)  DRAIN/DEVICES: R & L Forearm IV's. R IV infusing NS bolus   TELEMETRY RHYTHM: NSR  SKIN: scattered bruises and scabs   TESTS/PROCEDURES: none this shift   D/C DAY/GOALS/PLACE: pending  OTHER IMPORTANT NOTE: NS @125 mL/hr infusing

## 2023-03-19 LAB
GLUCOSE BLDC GLUCOMTR-MCNC: 126 MG/DL (ref 70–99)
GLUCOSE BLDC GLUCOMTR-MCNC: 134 MG/DL (ref 70–99)
GLUCOSE BLDC GLUCOMTR-MCNC: 192 MG/DL (ref 70–99)
GLUCOSE BLDC GLUCOMTR-MCNC: 220 MG/DL (ref 70–99)
GLUCOSE BLDC GLUCOMTR-MCNC: 278 MG/DL (ref 70–99)
GLUCOSE BLDC GLUCOMTR-MCNC: 392 MG/DL (ref 70–99)
POTASSIUM SERPL-SCNC: 3.9 MMOL/L (ref 3.4–5.3)

## 2023-03-19 PROCEDURE — 84132 ASSAY OF SERUM POTASSIUM: CPT | Performed by: STUDENT IN AN ORGANIZED HEALTH CARE EDUCATION/TRAINING PROGRAM

## 2023-03-19 PROCEDURE — 120N000001 HC R&B MED SURG/OB

## 2023-03-19 PROCEDURE — 36415 COLL VENOUS BLD VENIPUNCTURE: CPT | Performed by: STUDENT IN AN ORGANIZED HEALTH CARE EDUCATION/TRAINING PROGRAM

## 2023-03-19 PROCEDURE — 99232 SBSQ HOSP IP/OBS MODERATE 35: CPT | Performed by: STUDENT IN AN ORGANIZED HEALTH CARE EDUCATION/TRAINING PROGRAM

## 2023-03-19 RX ADMIN — INSULIN ASPART 2 UNITS: 100 INJECTION, SOLUTION INTRAVENOUS; SUBCUTANEOUS at 16:25

## 2023-03-19 RX ADMIN — INSULIN DETEMIR 28 UNITS: 100 INJECTION, SOLUTION SUBCUTANEOUS at 22:04

## 2023-03-19 ASSESSMENT — ACTIVITIES OF DAILY LIVING (ADL)
ADLS_ACUITY_SCORE: 20

## 2023-03-19 NOTE — PROGRESS NOTES
[facility]    Medicine Progress Note - Hospitalist Service    Date of Admission:  3/16/2023    Assessment & Plan      Lauro Maddox is a 22 year old male admitted on 3/16/2023. He presents after an ingestion.     DKA, mild, resolved  Hyperglycemia, resolving  DM I, poorly controlled  [humalog 2 units with meals and at HS, carb counting, levemir 28 units daily]  Hgb A1C 14.2 1/2023. Has followed with endo in the past but doesn't appear to have seen in awhile.  Hospitalized 1/1, 1/20, 2/25, 3/8 for DKA. [place]. Improving at time of my visit, denies c/o. In ED tachy 110-120s, BPs stable. BS ~800. Ketones 1.0. lactic 3.4. other labs generally unremarkable. CXR clear.   - lantus 28 units at bedtime  - med sliding scale insulin  - aspart 1u per 8g CHO with meals and snacks.  - glucose TID and HS  - mod CHO diet    Lactic acidosis  * persistent lactic acidosis. No clear cause. No obvious infection.  * Denies injection drug use. Does have amateur tattoos.   - monitor  - check blood cultures.     Encephalopathy, toxic, resolving  Fentanyl use disorder  With fentanyl ingestion, found [place] and confused at airport, combative with EMS and given 125 mg IM ketamine. Reports he uses fentanyl every other day. Typically snorts powder or takes pills. Denies injection use.  - UDS  - chem dep- pt in agreement  - monitor for opiate w/d    Disposition  - discussed that we can wait for a few days to see if addiction rehab becomes available. Otherwise medically ready for discharge.       Diet: Combination Diet Very High Consistent Carb (90 g CHO per Meal) Diet    DVT Prophylaxis: Pneumatic Compression Devices  Wilson Catheter: Not present  Lines: None     Cardiac Monitoring: None  Code Status: Full Code      Clinically Significant Risk Factors        # Hypokalemia: Lowest K = 3.3 mmol/L in last 2 days, will replace as needed                          Disposition Plan      Expected Discharge Date:  03/20/2023        Discharge Comments: FU will be done on referals by SW to residential MUSA's programs on Monday.          Jude Rollins MD  Hospitalist Service  [facility]  Securely message with [employer] (more info)  Text page via [employer] Paging/Directory   ______________________________________________________________________    Interval History   Fatigued. No other issues reported. Still issues with BG control. Did liberalize diet as patient still hungry.    Physical Exam   Vital Signs: Temp: 98.8  F (37.1  C) Temp src: Oral BP: 115/79 Pulse: 73   Resp: 16 SpO2: 99 % O2 Device: None (Room air)    Weight: 154 lbs .92 oz    Constitutional: Awake, alert, cooperative, no apparent distress  Respiratory: Clear to auscultation bilaterally, no crackles or wheezing  Cardiovascular: Regular rate and rhythm, normal S1 and S2, and no murmur noted  GI: Normal bowel sounds, soft, non-distended, non-tender  Skin/Integumen: No rashes, no cyanosis, no edema  Other:       Medical Decision Making       40 MINUTES SPENT BY ME on the date of service doing chart review, history, exam, documentation & further activities per the note.      Data   ------------------------- PAST 24 HR DATA REVIEWED -----------------------------------------------    I have personally reviewed the following data over the past 24 hrs:    N/A  \   N/A   / N/A     N/A N/A N/A /  126 (H)   4.1 N/A N/A \       Imaging results reviewed over the past 24 hrs:   No results found for this or any previous visit (from the past 24 hour(s)).

## 2023-03-19 NOTE — PLAN OF CARE
Summary:  AMS and Opiate abuse/ overdose   DATE & TIME: 3/18/23 7812-2893  Cognitive Concerns/ Orientation: A&Ox4  BEHAVIOR & AGGRESSION TOOL COLOR: Green  ABNL VS/O2: VSS on RA   MOBILITY: IND  PAIN MANAGMENT: Denies pain  DIET: Mod Carb- carb counting. Sticky note left for MD- pt says he is still hungry after meals, maybe advance diet and insulin coverage tomorrow?  BOWEL/BLADDER: Continent - no BM this shift   ABNL LAB/BG: B, 454 (MD paged)  DRAIN/DEVICES: PIV SL  TELEMETRY RHYTHM: tele d/c  SKIN: scattered bruises and scabs throughout body. Pt reports that bulge on abdomen is scar tissue   TESTS/PROCEDURES: none this shift   D/C DAY/GOALS/PLACE: pending CD inpatient tx  OTHER IMPORTANT NOTE:

## 2023-03-19 NOTE — PLAN OF CARE
Orientation A&Ox4    Vitals/Tele VSS RA    IV Access/drains PIV SL    Diet MCHO    Mobility IND    GI/ Continent B&B    Labs: , 278, 220 - House NP paged prior to start of shift for additional insulin correction. BG rechecked and held one time dose, as BG recheck was trending down.  Sticky note left for provider to clarify if D51/2 NS continuous was still being considered.  Patient has not met order parameters.     Wound/Skin Dry, flaky. Scattered bruising and scabs.     Consults CD; SW following.     Discharge Plan CD tx when bed available      See Flow sheets for assessment

## 2023-03-19 NOTE — PROVIDER NOTIFICATION
MD Notification    Notified Person: MD    Notified Person Name: Luba CRYSTAL on call    Notification Date/Time: 2111 3/18/23    Notification Interaction: amcom    Purpose of Notification: pt's HS . Gave 28 levimir and 5 of novalog. Do you think we should give more insulin or recheck again before 0200?    Second page at 4443.    Orders Received: pending    Comments: [id number] asked Lino ROBERTSON for advice on treatment. Ordered 5u Novalog and recheck BG at 0200.

## 2023-03-19 NOTE — PROGRESS NOTES
[facility] brief note:    Paged by nursing to address BG of 454.  Will order 5U insulin aspart now.  Nursing notes pt's BG will be rechecked at 0200.    DESIREE Jacques, CNP  [facility]    No charge.

## 2023-03-20 VITALS
DIASTOLIC BLOOD PRESSURE: 76 MMHG | BODY MASS INDEX: 19.77 KG/M2 | OXYGEN SATURATION: 98 % | RESPIRATION RATE: 16 BRPM | HEART RATE: 76 BPM | WEIGHT: 154.06 LBS | HEIGHT: 74 IN | SYSTOLIC BLOOD PRESSURE: 112 MMHG | TEMPERATURE: 98.6 F

## 2023-03-20 LAB
GLUCOSE BLDC GLUCOMTR-MCNC: 175 MG/DL (ref 70–99)
GLUCOSE BLDC GLUCOMTR-MCNC: 222 MG/DL (ref 70–99)
GLUCOSE BLDC GLUCOMTR-MCNC: 278 MG/DL (ref 70–99)

## 2023-03-20 PROCEDURE — 250N000012 HC RX MED GY IP 250 OP 636 PS 637: Performed by: INTERNAL MEDICINE

## 2023-03-20 PROCEDURE — 99239 HOSP IP/OBS DSCHRG MGMT >30: CPT | Performed by: INTERNAL MEDICINE

## 2023-03-20 RX ADMIN — INSULIN ASPART 2 UNITS: 100 INJECTION, SOLUTION INTRAVENOUS; SUBCUTANEOUS at 08:51

## 2023-03-20 RX ADMIN — INSULIN ASPART 1 UNITS: 100 INJECTION, SOLUTION INTRAVENOUS; SUBCUTANEOUS at 12:05

## 2023-03-20 RX ADMIN — INSULIN GLARGINE 5 UNITS: 100 INJECTION, SOLUTION SUBCUTANEOUS at 12:08

## 2023-03-20 ASSESSMENT — ACTIVITIES OF DAILY LIVING (ADL)
ADLS_ACUITY_SCORE: 20

## 2023-03-20 NOTE — PROGRESS NOTES
Paged Dr. Xiong that there are no  IP CD treatment places that can accept the patient as of this time. Patient is requesting to discharge.

## 2023-03-20 NOTE — PLAN OF CARE
Goal Outcome Evaluation:    3/20/23  7a to 3 p  Summary:  AMS and Opiate abuse/ overdose      Orientation: A&O, calm and cooperative     Vitals/Tele: VSS on rA     IV Access/drains: L IV infusing, removed R IV     Diet: Mod carb, BG checks, 8 am , 12 nn  Type 1 diabetic  1 time 5 units Lantus ordered     Mobility: Independent     GI/: Continent     Wound/Skin: scattered scabs, pale     Consults: Hospitalist following     Discharge Plan: Chem dep IP treatment facility placement today or tomorrow,  Otherwise patient will be discharged to prior to living arrangement

## 2023-03-20 NOTE — PROGRESS NOTES
Care Management Discharge Note    Discharge Date: 03/20/2023       Discharge Disposition:      Discharge Services:      Discharge DME:      Discharge Transportation:      Private pay costs discussed: Not applicable    PAS Confirmation Code:    Patient/family educated on Medicare website which has current facility and service quality ratings:      Education Provided on the Discharge Plan:  yes  Persons Notified of Discharge Plans: patient  Patient/Family in Agreement with the Plan:      Handoff Referral Completed: No    Additional Information:  Patient is choosing to leave and has been discharged today.  Patient does not have a cell phone.  He is interested in hearing  If a program offers him admission.  He will contact writer tomorrow afternoon for an update.   Today he is going to a shelter he reports at [address] [facility]   Writer is arranging a taxi ride via the hospital account.          ROMY Ayala

## 2023-03-20 NOTE — PLAN OF CARE
Goal Outcome Evaluation:    3/19/23  7a to 7 p  Summary:  AMS and Opiate abuse/ overdose      Orientation: A&O, calm and cooperative     Vitals/Tele: VSS on rA     IV Access/drains: L IV infusing, removed R IV     Diet: Mod carb, BG checks, , 126, 192, Type 1 diabetic,     Mobility: Independent     GI/: Continent     Wound/Skin: scattered scabs, pale     Consults: Hospitalist following     Discharge Plan: Chem dep treatment facility, SW following

## 2023-03-20 NOTE — PLAN OF CARE
Orientation A&Ox4     Vitals/Tele VSS RA     IV Access/drains PIV SL     Diet MCHO     Mobility IND     GI/ Continent B&B     Labs: .     Wound/Skin Dry, flaky. Scattered bruising and scabs.      Consults CD; SW following.      Discharge Plan CD in patient vs IOP.         See Flow sheets for assessment

## 2023-03-20 NOTE — PLAN OF CARE
Shift Summary      Neuro: A/Ox4. Cooperative with cares. Uses his call light for assistance when needed.     Cardiac: Regular apical pulse.     Respiratory: LS clear.     GI: Continent of bowel and bladder. Denied nausea.     Musculoskeletal: Independent in the room.     Pain: Denied pain.     Skin: No new skin concerns.     Lines: PIV to right arm.

## 2023-03-20 NOTE — PROGRESS NOTES
Care Management Follow Up    Length of Stay (days): 3    Expected Discharge Date: 03/21/2023     Concerns to be Addressed:       Patient plan of care discussed at interdisciplinary rounds: Yes    Anticipated Discharge Disposition:  Residential [facility] treatment   Anticipated Discharge Services:    Anticipated Discharge DME:      Patient/family educated on Medicare website which has current facility and service quality ratings:    Education Provided on the Discharge Plan:    Patient/Family in Agreement with the Plan:      Referrals Placed by CM/SW:    Private pay costs discussed: Not applicable    Additional Information:  Received a call today from [facility].  Patient has been clinically accepted and the Intake department will send out inquiries to their residential programs to see which facility is available.  Writer met with patient and explained this to him.  Explained writer expects to hear back from [place] either later today or tomorrow.  Patient reports he prefers to discharge today and return to a shelter he has stayed at previously and completing housing application.  Writer has informed the bedside RN to update the MD.  If writer has a cell phone, [facility] can follow up with patient after he leaves the hospital.      GERA AyalaSW

## 2023-03-21 ENCOUNTER — PATIENT OUTREACH (OUTPATIENT)
Dept: CARE COORDINATION | Facility: CLINIC | Age: 23
End: 2023-03-21
Payer: COMMERCIAL

## 2023-03-21 NOTE — PROGRESS NOTES
Clinic Care Coordination Contact  [facility]       Clinical Data: Care Coordinator Outreach  Outreach attempted No phone  number on file. Care Coordinator will do no further outreaches at this time.    Janice Barclay  [phone]  Care

## 2023-03-23 LAB — BACTERIA BLD CULT: NO GROWTH

## 2024-07-17 NOTE — PROGRESS NOTES
July 17, 2024     Patient: Roly Mercedes   YOB: 1972   Date of Visit: 7/17/2024       To Whom it May Concern:    Roly Mercedes was seen in my clinic on 7/17/2024.  He should remain out of work until follow-up appointment on 10/23/24 at which point he will be re-examined.      Sincerely,          Roldan More MD        CC: No Recipients   Pediatric Endocrinology Follow-up Consultation: Diabetes    Patient: Lauro Maddox MRN# [id number]   YOB: 2000 Age: 16 year old   Date of Visit: 08/23/2017    Dear Dr. Nataliia Uribe:    I had the pleasure of seeing your patient, Lauro Maddox in the Pediatric Endocrinology Clinic, [facility], on 08/23/2017 for a follow-up consultation of Type 1 diabetes.           Problem list:     Patient Active Problem List    Diagnosis Date Noted     Type 1 diabetes mellitus with hyperglycemia (H) 11/01/2016     Priority: Medium     DKA (diabetic ketoacidoses) (H) 10/14/2015     Priority: Medium     Type 1 diabetes mellitus, uncontrolled (H) 07/25/2014     Priority: Medium     Vitamin D deficiency 01/24/2014     Priority: Medium     Problem list name updated by automated process. Provider to review       Diabetes mellitus type I, controlled (H) 03/21/2013     Priority: Medium     Diagnosed when Lauro was 1.5 years old              HPI:   Lauro is a 16 year old male with Type 1 diabetes mellitus who was accompanied to this appointment by his pending adoptive parents, Donna and Andrew.  Lauro was last seen in our clinic on 6/16/2017.  I had the pleasure of meeting Donna and Andrew for a care conference prior to Lauro's placement on 7/18/2017.      Lauro moved to essentially full time to [facility]'s home 8/18/2017. He had previously moved from a group home in 11/2016 to foster care home.  Unfortunately, after foster care placement 11/2016, his blood glucose control had shown a significant worsening prompting more frequent diabetes clinic follow up.  He required hospitalization for DKA on 2/14/2017 at [facility].       Donna and Andrew were able to meet with Michelle Bird RN, CDE prior to our visit today for diabetes education.  They will be meeting with our dietician after our clinic visit.         We reviewed the following additional history at today's visit:  Hospitalizations or ED visits since last  encounter: none  Episodes of severe hypoglycemia since last visit: none  Awareness of hypoglycemia: normal  Episodes of DKA since last visit: none  Insulin prior to meals: reported premeals  Issues with ketonuria/pump site failure since last visit: none    Exercise: playing football    Blood Glucose Data:   BG average for past 2 weeks: 311, SD: 138  Average tests per day: 2.1  A1c:  Lab Results   Component Value Date    A1C 11.2 (A) 08/23/2017    A1C 11.5 07/07/2017    A1C 11.1 (A) 06/16/2017    A1C 9.7 05/02/2017    A1C 10.0 03/24/2017     Result was discussed at today's visit.     Current insulin regimen:   Injectable Insulin:   Tresiba 24 units at bedtime  Novolog Breakfast 1/8 grams, after 3pm 1/10 grams   Novolog 1 per 50>150    Insulin administration site(s): arms    I reviewed new history from the patient and the medical record.  I have reviewed previous lab results and records, patient BMI and the growth chart at today's visit.  I have reviewed glucometer download, .    History was obtained from patient, electronic health record and patient's caregivers.          Social History:       Moved from group home to foster care 11/21/2016.  Moved in with future adoptive parents 8/18/2017 in [facility].  He will be entering 9th grade in fall 2017.  Playing football.      Lauro has 3 biological siblings who live with his (presumed) biological parents.               Family History:     Family History   Problem Relation Age of Onset     DIABETES Paternal Grandfather      Onset in childhood per Lauro     Hypertension No family hx of      CANCER No family hx of      Endocrine Disease No family hx of      Thyroid Disease No family hx of      Glaucoma No family hx of      Macular Degeneration No family hx of      CEREBROVASCULAR DISEASE No family hx of        Family history was reviewed and is unchanged from above.          Allergies:   No Known Allergies          Medications:     Current Outpatient Prescriptions  "  Medication Sig Dispense Refill     blood glucose monitoring (ACCU-CHEK JULIO C PLUS) meter device kit Use to test blood sugars 6-8 times daily or as directed. 1 kit 1     blood glucose monitoring (ACCU-CHEK JULIO C PLUS) test strip Use to test blood sugar 6-8 times daily or as directed. 250 strip 11     insulin degludec (TRESIBA) 100 UNIT/ML pen 24 units once daily at the same time each day (replaces the Lantus) 15 mL 6     insulin aspart (NOVOLOG FLEXPEN) 100 UNIT/ML injection Up to 75 units daily 30 mL 6     acetone, Urine, test STRP Test ketones when sick or when have 2 blood sugars in a row >300 50 each 11     blood glucose (ACCU-CHEK FASTCLIX) lancing device Device to be used with lancets. 2 each 0     cloNIDine (CATAPRES) 0.1 MG tablet 1 tablet at bedtime, 1/2 tablet in the morning and 1/2 in the afternoon 60 tablet 0     insulin pen needle (BD ALIN U/F) 32G X 4 MM Use 8 daily or as directed. 200 each 12     blood glucose monitoring (ACCU-CHEK FASTCLIX) lancets 6 each daily 2 Box 11     glucagon (GLUCAGON EMERGENCY) 1 MG kit 1mg injection for severe hypoglycemica involving loss of consciousness or seizure. 1 each 6     cholecalciferol (VITAMIN D) 1000 UNIT tablet Take 1 tablet (1,000 Units) by mouth daily 100 tablet 3             Review of Systems:   ENDOCRINE: see HPI  GENERAL:  Negative.  ENT: Negative  RESPIRATORY: Negative  CARDIO: Negative.  GASTROINTESTINAL: Negative.  HEMATOLOGIC: Negative  GENITOURINARY: Negative.  MUSCOLOSKELETAL: Negative.  PSYCHIATRIC: Negative  NEURO: Negative  SKIN: Negative.         Physical Exam:   Blood pressure 103/64, pulse 81, height 5' 9.5\" (176.5 cm), weight 130 lb 15.3 oz (59.4 kg).  Blood pressure percentiles are 12 % systolic and 45 % diastolic based on NHBPEP's 4th Report. Blood pressure percentile targets: 90: 130/80, 95: 133/85, 99 + 5 mmH/98.  Height: 5' 9.5\", 85 %ile (Z= 1.04) based on CDC 2-20 Years stature-for-age data using vitals from 2017.  Weight: " 130 lbs 15.25 oz, 66 %ile (Z= 0.42) based on CDC 2-20 Years weight-for-age data using vitals from 8/23/2017.  BMI: Body mass index is 19.06 kg/(m^2)., 41 %ile (Z= -0.23) based on CDC 2-20 Years BMI-for-age data using vitals from 8/23/2017.      CONSTITUTIONAL:   Awake, alert, and in no apparent distress.  HEAD: Normocephalic, without obvious abnormality.  EYES: Lids and lashes normal, sclera clear, conjunctiva normal.  NECK: Supple, symmetrical, trachea midline.  THYROID: symmetric, not enlarged and no tenderness.  HEMATOLOGIC/LYMPHATIC: No cervical lymphadenopathy.  LUNGS: No increased work of breathing, clear to auscultation bilaterally with good air entry.  CARDIOVASCULAR: Regular rate and rhythm, no murmurs.  NEUROLOGIC:No focal deficits noted. Reflexes were symmetric at patella bilaterally.  PSYCHIATRIC: Cooperative, no agitation.  SKIN: Insulin administration sites intact with areas of mild lipohypertrophy bilaterally to arms and umbilicus. No acanthosis nigricans.  MUSCULOSKELETAL: There is no redness, warmth, or swelling of the joints.  Full range of motion noted.  Motor strength and tone are normal.  ENT: Nares clear, oral pharynx with moist mucus membranes.  ABDOMEN: Soft, non-distended, non-tender, no masses palpated, no hepatosplenomegally.           Health Maintenance:   Diabetes History:    Date of Diabetes Diagnosis: 2004   Type of Diabetes: type 1   Antibodies done (yes/no): unknown   If Yes, Antibody Results:    Special Notes (if any):   Dates of Episodes DKA (month/year, cumulative excluding diagnosis): 2/14/2017  Dates of Episodes Severe* Hypoglycemia (month/year, cumulative): 0   *Severe=patient unconscious, seizure, unable to help self   Last Annual Lab Studies:  IgA Level (<5 is IgA deficiency):   IGA   Date Value Ref Range Status   01/16/2014 231 70 - 380 mg/dL Final      Celiac Screen (annual):   Tissue Transglutaminase Antibody IgA   Date Value Ref Range Status   11/01/2016 1 <7 U/mL Final      Comment:     Negative      Thyroid (every 2 years):   TSH   Date Value Ref Range Status   11/01/2016 2.35 0.40 - 4.00 mU/L Final   ]   T4 Free   Date Value Ref Range Status   11/01/2016 0.84 0.76 - 1.46 ng/dL Final      Lipids (every 5 years age 10 and older):   Recent Labs   Lab Test  07/25/14   1442  01/16/14   1627   CHOL  163  178   HDL  61  68   LDL  71  84   TRIG  157*  132   CHOLHDLRATIO  2.7  3.0      Urine Microalbumin (annual):   Albumin Urine mg/L   Date Value Ref Range Status   11/01/2016 30 mg/L Final      No results found for: MICROALBUMIN]@   Date Last Saw Psychologist: 9/9/2015   Date Last Saw Dietitian: today  Date Last Eye Exam: 1/23/2017   Patient Report or Letter: no  Location of Last [facility] exam: [facility]  Date Last Dental Appointment: 1/21/2017  Date Last Influenza Shot (or refused): 11/1/2016  Date of Last Visit: 7/2017  Missed days of school related to diabetes concerns (illness, hypoglycemia, parental worry since last visit due to DM, excluding routine medical visits): 0   Depression Screening (age 10 and older only):   Today's PHQ-2 Score:  0       Assessment and Plan:   Lauro  is a 16 year old male with Type 1 diabetes mellitus, with hyperglycemia.      Lauro has recently moved into future adoptive parents' home.  We reviewed blood glucose meter download today in clinic and Lauro has not consistently been testing blood glucoses when requested and has falsified results.  Plan for verification of blood glucose meter data and supervision of insulin administration was discussed/recommended.  Goals to set for A1c improvement to 9% or less made by next visit.  Setting rewards for goal of testing 4x/s per day weekly with improved blood glucose average under 300 first week, under 250 second week, and ultimately getting under 200 discussed.      Plan:        Patient Instructions   Thank you for choosing [facility]. It was a pleasure to see you for your  office visit today.     To reach our Specialty Clinic: [phone]  To reach our Imaging scheduler: [phone]      If you had any blood work, imaging or other tests:  Normal test results will be mailed to your home address in a letter  Abnormal results will be communicated to you via phone call/letter  Please allow up to 1-2 weeks for processing/interpretation of most lab work  If you have questions or concerns call our clinic at [phone]    1.  Lauro's A1c today is 11.2 in comparison to 11.5 at last visit.  This remains well above goal of 7.5 or less but it is not expected to have A1c at goal with recent move to home.   2.  Direct verification of blood glucose testing is recommended as well as supervision of insulin administration (especially Tresiba).    3.  You have great ideas for monetary rewards for improved diabetes management.    4.  No changes to present insulin dosing is recommended.   5.  Please follow up in 2 months with Dr. Vera at our [facility] location.    6.  Phone follow up with Michelle Phone: [phone] in 2 weeks recommended.     Thank you for allowing me to participate in the care of your patient.  Please do not hesitate to call with questions or concerns.    Sincerely,    DESIREE Arizmendi, CNP  Pediatric Endocrinology  [facility]  [facility]  [phone]      CC  Patient Care Team:  Nataliia Uribe MD as PCP - General (Pediatrics)  Pediatrics, [facility] as PCP  Yvonne Grider MD as MD (Pediatrics)  Sharlene Maciel APRN CNP as Nurse Practitioner (Nurse Practitioner - Pediatrics)  Angel Pickett, PhD LP as Psychologist (Neuropsychology)